# Patient Record
Sex: FEMALE | Race: WHITE | NOT HISPANIC OR LATINO | Employment: PART TIME | URBAN - METROPOLITAN AREA
[De-identification: names, ages, dates, MRNs, and addresses within clinical notes are randomized per-mention and may not be internally consistent; named-entity substitution may affect disease eponyms.]

---

## 2019-10-18 ENCOUNTER — TELEPHONE (OUTPATIENT)
Dept: NEUROLOGY | Facility: CLINIC | Age: 29
End: 2019-10-18

## 2019-10-18 NOTE — TELEPHONE ENCOUNTER
----- Message from Tran Jeff sent at 10/18/2019  8:31 AM CDT -----  Type: Needs Medical Advice    Who Called:  patient  Symptoms (please be specific):  migraines  How long has patient had these symptoms:  sarah  Pharmacy name and phone #:  sarah  Best Call Back Number: [phone]  Additional Information: patient is asking if the referral from Dr Petrona Rolon has been received for Dr Aguilar to see patient for migraines?/please advise/was sent earlier this week

## 2019-10-18 NOTE — TELEPHONE ENCOUNTER
Called patient and informed I have not received the referral yet but I could go ahead and schedule appointment. Appointment scheduled and appointment added to wait list. Patient expressed understanding.

## 2019-10-21 ENCOUNTER — TELEPHONE (OUTPATIENT)
Dept: NEUROLOGY | Facility: CLINIC | Age: 29
End: 2019-10-21

## 2019-10-21 NOTE — TELEPHONE ENCOUNTER
Tried to call patient. No answer. Left voicemail for sooner appointment. Called and spoke with patient's mother, Steffanie. Patient is currently at ER for migraine and mother is headed there. Appointment rescheduled to 11/11/2019 at 8:45 am. Steffanie verbalized understanding and will notify patient once at hospital.

## 2019-10-29 ENCOUNTER — TELEPHONE (OUTPATIENT)
Dept: NEUROLOGY | Facility: CLINIC | Age: 29
End: 2019-10-29

## 2019-10-29 NOTE — TELEPHONE ENCOUNTER
Called and spoke with patient. Offered patient a sooner appointment for tomorrow at 8:45 am. Patient accepted. Appointment rescheduled. Patient verbalized understanding.

## 2019-10-30 ENCOUNTER — OFFICE VISIT (OUTPATIENT)
Dept: NEUROLOGY | Facility: CLINIC | Age: 29
End: 2019-10-30
Payer: COMMERCIAL

## 2019-10-30 ENCOUNTER — LAB VISIT (OUTPATIENT)
Dept: LAB | Facility: HOSPITAL | Age: 29
End: 2019-10-30
Attending: PSYCHIATRY & NEUROLOGY
Payer: COMMERCIAL

## 2019-10-30 VITALS
RESPIRATION RATE: 17 BRPM | HEIGHT: 65 IN | HEART RATE: 93 BPM | BODY MASS INDEX: 28.37 KG/M2 | WEIGHT: 170.31 LBS | DIASTOLIC BLOOD PRESSURE: 75 MMHG | SYSTOLIC BLOOD PRESSURE: 121 MMHG

## 2019-10-30 DIAGNOSIS — G43.001 MIGRAINE WITHOUT AURA AND WITH STATUS MIGRAINOSUS, NOT INTRACTABLE: Primary | ICD-10-CM

## 2019-10-30 DIAGNOSIS — E28.2 PCOS (POLYCYSTIC OVARIAN SYNDROME): ICD-10-CM

## 2019-10-30 DIAGNOSIS — F45.8 BRUXISM: ICD-10-CM

## 2019-10-30 DIAGNOSIS — G43.001 MIGRAINE WITHOUT AURA AND WITH STATUS MIGRAINOSUS, NOT INTRACTABLE: ICD-10-CM

## 2019-10-30 DIAGNOSIS — Z83.49 FAMILY HISTORY OF ADDISON'S DISEASE: ICD-10-CM

## 2019-10-30 DIAGNOSIS — E03.9 HYPOTHYROIDISM, UNSPECIFIED TYPE: ICD-10-CM

## 2019-10-30 LAB
ALBUMIN SERPL BCP-MCNC: 4.6 G/DL (ref 3.5–5.2)
ALP SERPL-CCNC: 44 U/L (ref 55–135)
ALT SERPL W/O P-5'-P-CCNC: 11 U/L (ref 10–44)
ANION GAP SERPL CALC-SCNC: 9 MMOL/L (ref 8–16)
AST SERPL-CCNC: 15 U/L (ref 10–40)
BASOPHILS # BLD AUTO: 0.03 K/UL (ref 0–0.2)
BASOPHILS NFR BLD: 0.4 % (ref 0–1.9)
BILIRUB SERPL-MCNC: 0.5 MG/DL (ref 0.1–1)
BUN SERPL-MCNC: 9 MG/DL (ref 6–20)
CALCIUM SERPL-MCNC: 10.1 MG/DL (ref 8.7–10.5)
CHLORIDE SERPL-SCNC: 102 MMOL/L (ref 95–110)
CO2 SERPL-SCNC: 28 MMOL/L (ref 23–29)
CREAT SERPL-MCNC: 0.8 MG/DL (ref 0.5–1.4)
DIFFERENTIAL METHOD: ABNORMAL
EOSINOPHIL # BLD AUTO: 0.1 K/UL (ref 0–0.5)
EOSINOPHIL NFR BLD: 1.4 % (ref 0–8)
ERYTHROCYTE [DISTWIDTH] IN BLOOD BY AUTOMATED COUNT: 12.8 % (ref 11.5–14.5)
EST. GFR  (AFRICAN AMERICAN): >60 ML/MIN/1.73 M^2
EST. GFR  (NON AFRICAN AMERICAN): >60 ML/MIN/1.73 M^2
GLUCOSE SERPL-MCNC: 91 MG/DL (ref 70–110)
HCT VFR BLD AUTO: 41.6 % (ref 37–48.5)
HGB BLD-MCNC: 14.1 G/DL (ref 12–16)
IMM GRANULOCYTES # BLD AUTO: 0.05 K/UL (ref 0–0.04)
IMM GRANULOCYTES NFR BLD AUTO: 0.6 % (ref 0–0.5)
LYMPHOCYTES # BLD AUTO: 1.9 K/UL (ref 1–4.8)
LYMPHOCYTES NFR BLD: 22.1 % (ref 18–48)
MCH RBC QN AUTO: 30.3 PG (ref 27–31)
MCHC RBC AUTO-ENTMCNC: 33.9 G/DL (ref 32–36)
MCV RBC AUTO: 90 FL (ref 82–98)
MONOCYTES # BLD AUTO: 0.9 K/UL (ref 0.3–1)
MONOCYTES NFR BLD: 10.1 % (ref 4–15)
NEUTROPHILS # BLD AUTO: 5.6 K/UL (ref 1.8–7.7)
NEUTROPHILS NFR BLD: 65.4 % (ref 38–73)
NRBC BLD-RTO: 0 /100 WBC
PLATELET # BLD AUTO: 315 K/UL (ref 150–350)
PMV BLD AUTO: 11.9 FL (ref 9.2–12.9)
POTASSIUM SERPL-SCNC: 4.2 MMOL/L (ref 3.5–5.1)
PROT SERPL-MCNC: 7.9 G/DL (ref 6–8.4)
RBC # BLD AUTO: 4.65 M/UL (ref 4–5.4)
SODIUM SERPL-SCNC: 139 MMOL/L (ref 136–145)
WBC # BLD AUTO: 8.49 K/UL (ref 3.9–12.7)

## 2019-10-30 PROCEDURE — 99204 OFFICE O/P NEW MOD 45 MIN: CPT | Mod: S$GLB,,, | Performed by: PSYCHIATRY & NEUROLOGY

## 2019-10-30 PROCEDURE — 99204 PR OFFICE/OUTPT VISIT, NEW, LEVL IV, 45-59 MIN: ICD-10-PCS | Mod: S$GLB,,, | Performed by: PSYCHIATRY & NEUROLOGY

## 2019-10-30 PROCEDURE — 99999 PR PBB SHADOW E&M-EST. PATIENT-LVL III: CPT | Mod: PBBFAC,,, | Performed by: PSYCHIATRY & NEUROLOGY

## 2019-10-30 PROCEDURE — 85025 COMPLETE CBC W/AUTO DIFF WBC: CPT

## 2019-10-30 PROCEDURE — 3008F BODY MASS INDEX DOCD: CPT | Mod: CPTII,S$GLB,, | Performed by: PSYCHIATRY & NEUROLOGY

## 2019-10-30 PROCEDURE — 99999 PR PBB SHADOW E&M-EST. PATIENT-LVL III: ICD-10-PCS | Mod: PBBFAC,,, | Performed by: PSYCHIATRY & NEUROLOGY

## 2019-10-30 PROCEDURE — 80053 COMPREHEN METABOLIC PANEL: CPT

## 2019-10-30 PROCEDURE — 36415 COLL VENOUS BLD VENIPUNCTURE: CPT | Mod: PO

## 2019-10-30 PROCEDURE — 3008F PR BODY MASS INDEX (BMI) DOCUMENTED: ICD-10-PCS | Mod: CPTII,S$GLB,, | Performed by: PSYCHIATRY & NEUROLOGY

## 2019-10-30 RX ORDER — PANTOPRAZOLE SODIUM 40 MG/1
40 TABLET, DELAYED RELEASE ORAL DAILY
Qty: 17 TABLET | Refills: 0 | Status: SHIPPED | OUTPATIENT
Start: 2019-10-30 | End: 2020-01-06 | Stop reason: ALTCHOICE

## 2019-10-30 RX ORDER — PREDNISONE 10 MG/1
TABLET ORAL
Qty: 55 TABLET | Refills: 0 | Status: SHIPPED | OUTPATIENT
Start: 2019-10-30 | End: 2020-01-06 | Stop reason: ALTCHOICE

## 2019-10-30 RX ORDER — TIZANIDINE 4 MG/1
TABLET ORAL
Qty: 30 TABLET | Refills: 11 | Status: SHIPPED | OUTPATIENT
Start: 2019-10-30 | End: 2020-01-06

## 2019-10-30 NOTE — PROGRESS NOTES
"Date of service: 10/30/2019  Referring provider: Aaareferral Self    Subjective:      Chief complaint: Migraine       Patient ID: Priscila Vazquez is a 29 y.o. lady with migraines, anxiety, polycystic ovarian syndrome, hypothyroidism, chronic fatigue presenting for evaluation of headache    History of Present Illness    ORIGINAL HEADACHE HISTORY - 10/30/2019  Age at onset and course over time:  Patient presenting as an ED follow-up from 10/21/2019 when she went to the ED for persistent headache for 6 weeks.  Was given Toradol, Zofran, Fiorinal, Reglan - caused anxiety.  CT head was negative. Her blood pressure was within normal limits.  Her headaches began at age 14 as menstrual migraines. Over the years gradually became more frequent. In recent times, was doing very well for 4 months. Migraines lasting more days than previous. First week Sept started with mild daily headaches, taking maxalt, then got oral surgery - "cleaned out infection" left over form previous wisdom teeth surgery, surgery was 45 min. Headaches have improved since the ED but still lingering. Family history but no personal history of [place]'s disease. Mom and cousin with severe migraines.   Location:  Right eye, forehead  Quality:  [] pressure [] tight [x] throbbing [] sharp [] stabbing   Severity:  Current 0, bili 0, worse 9  Duration:  Hr  Frequency:  5 days per week, to daily, about 7 pain-free days per month  Headaches awaken at night?:  No  Worst time of day:  Mid day and evening  Associated with: [x] photophobia [x]  phonophobia [] osmophobia [x] blurred vision  [] double vision [x] loss of appetite [x] nausea [] vomiting [] dizziness [] vertigo  [] tinnitus [x] irritability [] sinus pressure [x] problems with concentration   [] neck tightness   Alleviated by:  [x] sleep [x] darkness [] massage [] heat [x] ice [x] medication  Exacerbated by:  [] fatigue [] light [] noise [] smells [x] coughing [] sneezing  [x] bending over [x] ovulation [x] " menses [] alcohol [] change in weather [x]  stress  Ipsilateral autonomic: [] nasal congestion [] lacrimation [] ptosis [] injection [] edema [] foreign body sensation [] ear fullness   ICP:  No transient visual obscurations, presence of low pitched tinnitus - started a few years ago after some detox supplements, she does not think it is related to her migraines, 24/7 bilateral, not pulsatile, non positional  Sleep habits:  She describes for sleep as good  Caffeine intake:  None  Gyn status (if female):  Patient has polycystic ovarian syndrome; she has regular periods; recently started progesterone because it was low, checked by her non [facility] NP; not on any estrogen supplements    MIDAS 70 indicating severe headache related disability    Has changed diet since last week - gluten, sugar-free, veg, fruits     Current acute treatment:  Fiorinal 2 days per week  Maxalt 2 days per week    Current prevention:  None    Previously tried/failed acute treatment:  Excedrin migraine    Previously tried/failed preventative treatment:  topiramate - caused eye pain, blurred vision     Review of patient's allergies indicates:  No Known Allergies  Current Outpatient Medications   Medication Sig Dispense Refill    ARMOUR THYROID 60 mg Tab TAKE 1 TABLET BY MOUTH EVERY DAY 30 tablet 2    butalbital-aspirin-caffeine -40 mg (FIORINAL) -40 mg Cap Take 1 capsule by mouth every 4 (four) hours as needed. 20 capsule 0    LORazepam (ATIVAN) 0.5 MG tablet Take 1 tablet (0.5 mg total) by mouth every 8 (eight) hours as needed. 40 tablet 0    LORazepam (ATIVAN) 0.5 MG tablet Take 1 tablet (0.5 mg total) by mouth every 8 (eight) hours as needed. 40 tablet 1    ondansetron (ZOFRAN-ODT) 4 MG TbDL Take ONE tablet (FOUR MG total) by MOUTH UNDER THE TONGUE every EIGHT (eight) hours as needed for nausea. 25 tablet 2    rizatriptan (MAXALT-MLT) 10 MG disintegrating tablet DISSOLVE 1 TABLET IN MOUTH AT ONSET OF HEADACHE. MAY TAKE 2ND  TABLET 2 HOURS LATER. NO MORE THAN 2 IN 24 HOURS 9 tablet 3    pantoprazole (PROTONIX) 40 MG tablet Take 1 tablet (40 mg total) by mouth once daily. While on steroids for 17 days 17 tablet 0    predniSONE (DELTASONE) 10 MG tablet 5 tab PO x 5 days, then reduce by 10mg every 3 days until finished (17-18-29-20-10).Take with pantoprazole. 55 tablet 0    tiZANidine (ZANAFLEX) 4 MG tablet Half or full tablet by mouth at night as needed for muscle spasm 30 tablet 11     No current facility-administered medications for this visit.        Past Medical History  Past Medical History:   Diagnosis Date    Atypical Chest Pain 04/2016     Dr. Jeffy Cartagena Ruled Out GB DX With 2016 U/S    Chronic Fatigue     4/22/16 B12 = Normal; 4/19/16 Extensive Labs Reviewed    Chronic Mild Shortness Of Breath     4/19/16 Ordered Echocardiogram    Family H/O [place]'s Disease     Her Mother Has This; 04/2016 Random Cortisol And Cortrosyn Stimulation Test Were Normal    Generalized Arthralgias X 2 Weeks 4/19/16 FBM?    4/22/16 RF, MICHAEL, CPK, ESR = Normal     Generalized Paresthesias     Dr. Guille Cabrera Ruled MS Out 03/2016    Hypothyroidism     On [place]othier Thyroid; Her Mother Has Hypothyroidism Too    Migraines     Dr. Guille Cabrera; Her Mother Also Has Migraines    Mitral Valve Prolapse Ruled Out     4/22/16 Echocardiogram = Normal But With Thickened MV With Trivial MR    MTHFR Single Mutation     Her Natriopath Tested This 3/7/16    Polycystic Ovarian Syndrome     Dr. Renea Medrano    Situational Anxiety        Past Surgical History  Past Surgical History:   Procedure Laterality Date    MITRAL VALVE SURGERY      None         Family History  Family History   Problem Relation Age of Onset    Migraines Mother     Thyroid disease Mother     Breast cancer Maternal Aunt 60       Social History  Social History     Socioeconomic History    Marital status:      Spouse name: Not on file    Number of children: Not on file     Years of education: Not on file    Highest education level: Not on file   Occupational History    Not on file   Social Needs    Financial resource strain: Not on file    Food insecurity:     Worry: Not on file     Inability: Not on file    Transportation needs:     Medical: Not on file     Non-medical: Not on file   Tobacco Use    Smoking status: Never Smoker    Smokeless tobacco: Never Used   Substance and Sexual Activity    Alcohol use: No    Drug use: No    Sexual activity: Never   Lifestyle    Physical activity:     Days per week: Not on file     Minutes per session: Not on file    Stress: Not on file   Relationships    Social connections:     Talks on phone: Not on file     Gets together: Not on file     Attends Mosque service: Not on file     Active member of club or organization: Not on file     Attends meetings of clubs or organizations: Not on file     Relationship status: Not on file   Other Topics Concern    Not on file   Social History Narrative    Not on file        Review of Systems  14-point review of systems as follows:   No check ynes indicates NEGATIVE response   Constitutional: [x] weight loss, [x] change to appetite   Eyes: [] change in vision, [] double vision   Ears, nose, mouth, throat: [] frequent nose bleeds, [] ringing in the ears   Respiratory: [] cough, [] wheezing   Cardiovascular: [] chest pain, [] palpitations   Gastrointestinal: [] jaundice, [] nausea/vomiting   Genitourinary: [] incontinence, [] burning with urination   Hematologic/lymphatic: [] easy bruising/bleeding, [] night sweats   Neurological: [] numbness, [] weakness   Endocrine: [] fatigue, [] heat/cold intolerance   Allergy/Immunologic: [] fevers, [] chills   Musculoskeletal: [] muscle pain, [] joint pain   Psychiatric: [x] thoughts of harming self/others, [x] depression   Integumentary: [] rashes, [] sores that do not heal     Objective:        Vitals:    10/30/19 0855   BP: 121/75   Pulse: 93   Resp:  17     Body mass index is 28.34 kg/m².    10/30/19   Constitutional: appears in no acute distress, well-developed, well-nourished     Eyes: normal conjunctiva, PERRLA, optic discs are flat and sharp bilaterally     Ears, nose, mouth, throat: external appearance of ears and nose normal, hearing intact   Tongue scalloped    Cardiovascular: regular rate and rhythm, no murmurs appreciated    Respiratory: unlabored respirations, breath sounds normal bilaterally    Gastrointestinal: no visible abdominal masses, no guarding, no visible hernia    Musculoskeletal: normal tone in all four extremities. No atrophy. No abnormal movements. No pronator drift. No orbit. Symmetric finger tapping. Normal gait and station. Digits and nails normal.      Spine:   CERVICAL SPINE:  ROM: normal   MUSCLE SPASM: no   FACET LOADING: no   SPURLING: no  AMANDA / DOMONIQUE tender: no     Psychiatric: normal judgment and insight. Oriented to person, place, and time.     Neurologic:   Cortical functions: recent and remote memory intact, normal attention span and concentration, speech fluent, adequate fund of knowledge   Cranial nerves: visual fields full, PERRLA, EOMI, symmetric facial strength, hearing intact, palate elevates symmetrically, shoulder shrug 5/5, tongue protrudes midline   Reflexes: 2+ in the upper and lower extremities, no Rahman  Sensation: intact to temperature throughout   Coordination: normal finger to nose, tandem gait    Data Review:     I have personally reviewed the referring provider's notes, labs, & imaging made available to me today.      RADIOLOGY STUDIES:  I have personally reviewed the pertinent images performed.       Results for orders placed or performed during the hospital encounter of 10/21/19   CT Head Without Contrast    Narrative    EXAMINATION:  CT HEAD WITHOUT CONTRAST    CLINICAL HISTORY:  Headache, acute, norm neuro exam;    TECHNIQUE:  Axial images of the head were obtained without IV contrast administration.   Coronal and sagittal reconstructions were provided.  Three dimensional and MIP images were obtained and evaluated.  Total DLP was 820.64 mGy-cm. Dose lowering technique and automated exposure control were utilized for this exam.    COMPARISON:  CT of the head 01/02/2013.    FINDINGS:  There is normal brain formation.  There is normal gray-white matter differentiation.  There is no hemorrhage, hydrocephalus, or midline shift.  There is no cytotoxic or vasogenic edema.  There is no intra or extra-axial fluid collection.  There is no herniation.    The calvarium is intact.  There is no fracture.  The bilateral orbits are normal.  The paranasal sinuses and mastoid air cells are normally developed and well aerated.      Impression    No acute intracranial abnormality.      Electronically signed by: Albino Win MD  Date:    10/21/2019  Time:    14:41       Lab Results   Component Value Date     06/28/2017    K 3.9 06/28/2017     06/28/2017    CO2 28 06/28/2017    BUN 10 06/28/2017    CREATININE 0.61 06/28/2017    GLU 88 06/28/2017    AST 17 06/28/2017    AST 15 04/12/2016    ALT 29 06/28/2017    ALBUMIN 5.0 06/28/2017    PROT 8.3 06/28/2017    BILITOT 0.6 06/28/2017    CHOL 147 04/22/2016    HDL 44 04/22/2016    LDLCALC 91.0 04/22/2016    TRIG 60 04/22/2016       Lab Results   Component Value Date    WBC 7.97 06/28/2017    HGB 14.3 06/28/2017    HCT 41.0 06/28/2017    MCV 87 06/28/2017     06/28/2017       Lab Results   Component Value Date    TSH 0.350 (L) 06/28/2017           Assessment & Plan:       Problem List Items Addressed This Visit        Neuro    Migraine without aura and with status migrainosus, not intractable - Primary    Overview     Lifelong history of menstrual migraines starting with menarche, progressing over the years to episodic migraine, now at borderline high-frequency episodic/chronic frequency with frequent very long episodes of status migrainosus.  Strong family history of  migraine.  Neurological exam is very reassuring as CT head.  Optic discs are normal.  Recent normal eye exam.    There are no red flag features to this episodic pattern, the patient is most likely experiencing central sensitization as a cause of prolonged migraine.  Needs a very strong bridge regimen to break the cycle, we discussed the use of high-dose prednisone, nerve blocks, Methergine, infusions.  At of these, the most effective, and most cost effective will be prednisone and I provided ample education on this.          Relevant Medications    predniSONE (DELTASONE) 10 MG tablet    pantoprazole (PROTONIX) 40 MG tablet    tiZANidine (ZANAFLEX) 4 MG tablet    Other Relevant Orders    Comprehensive metabolic panel    CBC auto differential       ENT    Bruxism    Overview     Recommended tizanidine         Relevant Medications    tiZANidine (ZANAFLEX) 4 MG tablet       Endocrine    Hypothyroidism    Overview     On armour thyroid  Reports recently checked and OK         Polycystic Ovarian Syndrome    Overview     Repots periods are regular  I see no indication for patient to be on progesterone, recommended follow up by a gynecologist            Other    Family H/O [place]'s Disease    Current Assessment & Plan     Check electrolytes         Relevant Orders    Comprehensive metabolic panel    CBC auto differential              Please call our clinic at [phone] or send a message to Dr. Aguilar on the [facility] portal if there are any changes to the plan described below, for example,if you are not contacted for the requested tests, referral(s) within one week, if you are unable to receive the medications prescribed, or if you feel you need to change the treatment course for any reason.     Sign up for portal     TESTING:  -- schedule basic lab work (can do today)     REFERRALS:  -- none    PREVENTION (use daily regardless of headache):  -- start magnesium in ONE of the following preparations -    1. Magnesium oxide  800mg daily (the most common over the counter kind, may causes loose stools)   2. Magnesium citrate 400-500mg daily (harder to find, but more neutral on the bowels)   3. Magnesium glycinate 400mg daily (hardest to find, look online, but most bowel-neutral, best absorbed)   -- start riboflavin 400mg daily  -- start coenzyme q10 300mg daily (purchase when on sale)  -- alternative, may purchase ONE combination supplement as in the handout   -- start tizanidine half or full tablet at night for jaw tightness (take nightly at least 3 weeks, then may use as needed)  -- next line would be the pulse medication Propranolol     BRIDGE (to help NOW)  -- start 17 day course of steroids, in the morning, with food, with pantoprazole (acid medicine)  -- may continue your ativan if this causes anxiety     AS-NEEDED TREATMENT (use total no more than 10 days per month unless otherwise stated):  -- continue Maxalt (rizatriptan) at onset of migraine. May repeat every 2 hours. No more than 3 doses per day, 10 days per month  -- limit fioricet to no more than 5-10 tablets per month (for emergencies)    Follow up in about 3 months (around 1/30/2020) for office visit.    Rere Aguilar MD

## 2019-10-30 NOTE — PATIENT INSTRUCTIONS
Please call our clinic at [phone] or send a message to Dr. Aguilar on the [facility] portal if there are any changes to the plan described below, for example,if you are not contacted for the requested tests, referral(s) within one week, if you are unable to receive the medications prescribed, or if you feel you need to change the treatment course for any reason.     TESTING:  -- schedule basic lab work (can do today)     REFERRALS:  -- none    PREVENTION (use daily regardless of headache):  -- start magnesium in ONE of the following preparations -    1. Magnesium oxide 800mg daily (the most common over the counter kind, may causes loose stools)   2. Magnesium citrate 400-500mg daily (harder to find, but more neutral on the bowels)   3. Magnesium glycinate 400mg daily (hardest to find, look online, but most bowel-neutral, best absorbed)   -- start riboflavin 400mg daily  -- start coenzyme q10 300mg daily (purchase when on sale)  -- alternative, may purchase ONE combination supplement as in the handout   -- start tizanidine half or full tablet at night for jaw tightness (take nightly at least 3 weeks, then may use as needed)  -- next line would be the pulse medication Propranolol     BRIDGE (to help NOW)  -- start 17 day course of steroids, in the morning, with food, with pantoprazole (acid medicine)  -- may continue your ativan if this causes anxiety     AS-NEEDED TREATMENT (use total no more than 10 days per month unless otherwise stated):  -- continue Maxalt (rizatriptan) at onset of migraine. May repeat every 2 hours. No more than 3 doses per day, 10 days per month  -- limit fioricet to no more than 5-10 tablets per month (for emergencies)      -----    Vitamins and Dietary Supplements for Headache Prevention     Certain vitamins and food supplements may help prevent headaches when taken regularly. The best evidence exists for the agents below (from small but reasonably well-designed published trials). Side  effects are typically mild. Optimal doses are unknown, but are likely higher than found in typical multi-vitamins. Be wary of any unsubstantiated claims of spectacular benefits.       B2/Riboflavin - up to 400mg / day   Magnesium oxide- up to 400mg 2x / day (diarrhea possible) or Magnesium citrate 600mg once per day   Coenzyme Q10 - up to 100mg 3x / day (expensive)   Butterburr (Petasites hybridus) extract, Petadolex brand (pyrrolizidine alkaloid free), 50-75mg twice a day with food (expensive), http://www.petadolex.com.   Feverfew (Parthenium integrifolium) 50mg+ / day (inexpensive, weaker / contradictory evidence for effectiveness)   Melatonin - There is some evidence that this sleep hormone may help headaches. 3mg has been shown to be helpful in migraine, up to 9mg in cluster headaches. It should be taken ~1 hour before desired bedtime. Significant side effects are rare, but it may cause odd dreams or disruption of normal sleep-wake cycles. Higher doses (up to 24mg or so) may be useful as a substitute for indomethacin (the two molecules are chemically similar), a strong pain reliever effective for certain rare types of headache.       Migraine Multivitamins. There are a companies that package more than one of the above vitamins / supplements into a single pill. Some are available directly from , but most are available from a vitamin catalogues or websites:   Dolovent, info. Available at http://www.[employer].com (multivitamin with extra B2, Magnesium, CoEnzyme Q10)   HeadacheFree, http://www.headachefreevitamins.com (multivitamin, but with higher amounts of B2 and magnesium)   MigraDefense (contains magnesium, butterburr, and feverfew, but also contains supplements that have not been tested in trials for migraine)   MigreLief, http://www.migrelief.com (contains B2, magnesium, feverfew)   MigraMAXX (contains B2, magnesium, and butterbur)   Migravent, info. available at http://www.migravent.ViaSat (contains  B2, magnesium, CoEnzyme Q10, butterburr)   Migrasolve (contains B2 and butterbur).       We do not specifically endorse any brand name item, nor do we have any financial interest in any of these products.     Be aware that in the [place] vitamins and food supplements are not FDA-regulated. Thus there is absolutely no guarantee that what you are buying contains the listed ingredients or doses. One study from Consumer Reports indicates that the least expensive brands of supplements are often the ones that skimp on the active ingredients.

## 2019-10-31 ENCOUNTER — PATIENT MESSAGE (OUTPATIENT)
Dept: NEUROLOGY | Facility: CLINIC | Age: 29
End: 2019-10-31

## 2019-11-04 ENCOUNTER — TELEPHONE (OUTPATIENT)
Dept: NEUROLOGY | Facility: CLINIC | Age: 29
End: 2019-11-04

## 2019-11-04 NOTE — TELEPHONE ENCOUNTER
----- Message from Marissa Garces sent at 11/4/2019  3:25 PM CST -----  Contact: Pt  Patient is requesting a call back    Please call    Phone [phone]

## 2019-11-04 NOTE — TELEPHONE ENCOUNTER
Returned call and spoke with patient. Patient was concerned about her follow up being so far out and about what to do if the medications do not work. Informed patient that 3 month follow ups are standard and that if her medication is not helping her before then, she could come in for a follow up sooner if needed. Patient is having some relief from the steroids, but not complete relief. Advised patient to start the supplements and finish taking her steroid course and call us if she does not have relief. Patient verbalized understanding.

## 2019-11-11 ENCOUNTER — TELEPHONE (OUTPATIENT)
Dept: NEUROLOGY | Facility: CLINIC | Age: 29
End: 2019-11-11

## 2019-11-11 NOTE — TELEPHONE ENCOUNTER
Returned call and spoke with patient. Patient reports steroids are not helping her. Patient would like to know what else she could do. Informed patient per Dr. Aguilar that she either do injections or an infusion. Patient chose injections. Informed patient that I would call her back once the injections are approved with her insurance company. Patient verbalized understanding.

## 2019-11-11 NOTE — TELEPHONE ENCOUNTER
----- Message from Tamika Mathews sent at 11/11/2019  8:37 AM CST -----  Pt would like a call back with the nurse about an appt     [phone]

## 2019-11-15 ENCOUNTER — TELEPHONE (OUTPATIENT)
Dept: NEUROLOGY | Facility: CLINIC | Age: 29
End: 2019-11-15

## 2019-11-20 ENCOUNTER — TELEPHONE (OUTPATIENT)
Dept: NEUROLOGY | Facility: CLINIC | Age: 29
End: 2019-11-20

## 2019-11-20 NOTE — TELEPHONE ENCOUNTER
Returned call and spoke with patient. Appointment scheduled for tomorrow. Patient verbalized understanding.

## 2019-11-20 NOTE — TELEPHONE ENCOUNTER
----- Message from Ratna Parisi sent at 11/20/2019 10:00 AM CST -----  Contact: [phone]/self  Patient would like to know if her injection has been approved  Please call back to assist at [phone]

## 2019-11-21 ENCOUNTER — PROCEDURE VISIT (OUTPATIENT)
Dept: NEUROLOGY | Facility: CLINIC | Age: 29
End: 2019-11-21
Payer: COMMERCIAL

## 2019-11-21 ENCOUNTER — TELEPHONE (OUTPATIENT)
Dept: NEUROLOGY | Facility: CLINIC | Age: 29
End: 2019-11-21

## 2019-11-21 VITALS
HEIGHT: 65 IN | HEART RATE: 93 BPM | SYSTOLIC BLOOD PRESSURE: 136 MMHG | DIASTOLIC BLOOD PRESSURE: 80 MMHG | BODY MASS INDEX: 27.7 KG/M2 | WEIGHT: 166.25 LBS | RESPIRATION RATE: 16 BRPM

## 2019-11-21 DIAGNOSIS — M26.69 TMJ CAPSULITIS: Primary | ICD-10-CM

## 2019-11-21 DIAGNOSIS — F45.8 BRUXISM: ICD-10-CM

## 2019-11-21 PROCEDURE — 20605 PR DRAIN/INJECT INTERMEDIATE JOINT/BURSA: ICD-10-PCS | Mod: RT,S$GLB,, | Performed by: PSYCHIATRY & NEUROLOGY

## 2019-11-21 PROCEDURE — 20605 DRAIN/INJ JOINT/BURSA W/O US: CPT | Mod: RT,S$GLB,, | Performed by: PSYCHIATRY & NEUROLOGY

## 2019-11-21 RX ORDER — TRIAMCINOLONE ACETONIDE 40 MG/ML
12 INJECTION, SUSPENSION INTRA-ARTICULAR; INTRAMUSCULAR ONCE
Status: COMPLETED | OUTPATIENT
Start: 2019-11-21 | End: 2019-11-21

## 2019-11-21 RX ORDER — LIDOCAINE HYDROCHLORIDE 10 MG/ML
1 INJECTION, SOLUTION EPIDURAL; INFILTRATION; INTRACAUDAL; PERINEURAL ONCE
Status: COMPLETED | OUTPATIENT
Start: 2019-11-21 | End: 2019-11-21

## 2019-11-21 RX ADMIN — TRIAMCINOLONE ACETONIDE 12 MG: 40 INJECTION, SUSPENSION INTRA-ARTICULAR; INTRAMUSCULAR at 05:11

## 2019-11-21 RX ADMIN — LIDOCAINE HYDROCHLORIDE 10 MG: 10 INJECTION, SOLUTION EPIDURAL; INFILTRATION; INTRACAUDAL; PERINEURAL at 05:11

## 2019-11-21 NOTE — PROCEDURES
Procedures     Patient was scheduled for an occipital nerve block and trigger point injections to help with her headaches and neck pain. Upon presenting to the clinic she did not want to have this procedure done, she was under the impression that we are doing a procedure on her jaw.  She reported that she really thought the problem was her jaw given that it started after a dental procedure.  Several options were discussed and we decided together to proceed with a right TMJ injection both for diagnostic and therapeutic reasons.    Afterwards the patient developed a mild weakness of the right forehead due to lidocaine effect on the branches of the facial nerve.  Patient was warned prior to that this may happen temporarily due to lidocaine.  We discussed doing the procedure on both sides given that the jaw was bothering her on right and left, however after this event occurred she became very scared, did not like how her face was feeling, did not want to proceed with left side.  She did report that the pain in her face, occiput and her neck had subsided afterwards.    She was called for follow-up approximately 1.5 hours later, went to [facility].    -----    TEMPOROMANDIBULAR JOINT INJECTION - right     Indication: temporomandibular joint pain     Anesthesia: none     After risks and benefits were explained including bleeding, infection, worsening of the pain, damage to the area being injected, weakness, allergic reaction to medications, vascular injection, and nerve damage, signed consent was obtained. All questions were answered.     With the patient seated, I palpated right TMJ with the mouth closed and then the fully open position. The sulcus of the joint was identified and marked and patient was asked not to move jaw. Next, this area was prepped with alcohol. A 3ml syringe was used attached to 27g 1 inch needle. The joint was approached from posterior to the patient, with the needle making a 30 degree angle to the  sagittal plane, directed anteromedially. Needle was advanced intot joint capsule until loss of resistance was felt. Once in the joint, after negative aspiration of heme, I injected 1cc of the below listed solution. The patient was then asked to open and close jaw through full range of motion several times.     Medications used: lidocaine 1% and Triamcinolone 10mg     Total volume infused: 1 cc     Estimated blood loss: none     RTC as needed

## 2019-12-04 ENCOUNTER — TELEPHONE (OUTPATIENT)
Dept: NEUROLOGY | Facility: CLINIC | Age: 29
End: 2019-12-04

## 2019-12-04 DIAGNOSIS — G43.001 MIGRAINE WITHOUT AURA AND WITH STATUS MIGRAINOSUS, NOT INTRACTABLE: Primary | ICD-10-CM

## 2019-12-04 RX ORDER — GALCANEZUMAB 120 MG/ML
INJECTION, SOLUTION SUBCUTANEOUS
Qty: 2 ML | Refills: 0 | Status: SHIPPED | OUTPATIENT
Start: 2019-12-04 | End: 2020-01-23 | Stop reason: DRUGHIGH

## 2019-12-04 NOTE — TELEPHONE ENCOUNTER
Returned call and spoke with patient. Patient has had a daily migraine. Patient would like to know what else she can do. Informed patient the next step would be infusions per our last discussion. Patient discussed a possible preventative like Aimovig or Emgality. Patient is also willing to come back in for an occipital nerve block. Patient was just really nervous at her last visit and she now knows what to expect. She just wants relief and is unsure if she should come in for a follow up to further discuss options. Please advise.

## 2019-12-04 NOTE — TELEPHONE ENCOUNTER
----- Message from Michelle Carolina sent at 12/4/2019  9:47 AM CST -----  Pt need to speak with the nurse. Pt stated she is still suffering from migraines daily.       Pt contact # [phone].      Thanks

## 2019-12-05 ENCOUNTER — TELEPHONE (OUTPATIENT)
Dept: PHARMACY | Facility: CLINIC | Age: 29
End: 2019-12-05

## 2019-12-05 ENCOUNTER — TELEPHONE (OUTPATIENT)
Dept: NEUROLOGY | Facility: CLINIC | Age: 29
End: 2019-12-05

## 2019-12-05 NOTE — TELEPHONE ENCOUNTER
The TMJ injection did not help. It just made her jaw area numb. She ended up have a anxiety attack afterwards. Patient did see TMJ specialist on 12/03/2019. He gave her an injection in the occipital area (according to the patient). The injection has Lidocaine and Saparin. Patient does have slight relief.

## 2019-12-05 NOTE — TELEPHONE ENCOUNTER
----- Message from Charlette Daniel sent at 12/5/2019  1:02 PM CST -----  Contact: Pt  Pt says a prescription was sent to pharmacy and never received any information about this new medication requesting a callback at [phone]

## 2019-12-05 NOTE — TELEPHONE ENCOUNTER
Ok to begin Emgality, sent to [facility]  Please ask her for an update how the TMJ injection worked  Did she see the TMJ specialist like she was planning?

## 2019-12-13 ENCOUNTER — TELEPHONE (OUTPATIENT)
Dept: NEUROLOGY | Facility: CLINIC | Age: 29
End: 2019-12-13

## 2019-12-17 NOTE — TELEPHONE ENCOUNTER
DOCUMENTATION ONLY:   Prior authorization for emgality approved from 12/17/2019 to 03/13/2020.     Case ID# [id number]    Co-pay: $35-$0 with E-Voucher    Patient Assistance IS NOT required.     Forward to clinical pharmacist for consult & shipment. FLC

## 2019-12-26 ENCOUNTER — TELEPHONE (OUTPATIENT)
Dept: NEUROLOGY | Facility: CLINIC | Age: 29
End: 2019-12-26

## 2019-12-26 NOTE — TELEPHONE ENCOUNTER
----- Message from Bailee Owen sent at 12/26/2019  9:04 AM CST -----  Contact: patient  Type: Needs Medical Advice    Who Called:  Patient  Symptoms (please be specific):  Muscle Jerks  How long has patient had these symptoms:  Since Monday 12/23  Best Call Back Number:   Additional Information: Would like to schedule, sees Dr Aguilar already for headaches-Please advise-thank you

## 2019-12-27 ENCOUNTER — TELEPHONE (OUTPATIENT)
Dept: NEUROLOGY | Facility: CLINIC | Age: 29
End: 2019-12-27

## 2019-12-27 NOTE — TELEPHONE ENCOUNTER
----- Message from Vee Curtis sent at 12/27/2019  8:58 AM CST -----  Contact: patient  Type: Needs Medical Advice    Who Called:  patient  Best Call Back Number: na  Additional Information: Patient states office does not have to call her back today, no other information given.Thanks!

## 2019-12-27 NOTE — TELEPHONE ENCOUNTER
Returned call and spoke with patient. Patient scheduled an appointment with a general neurologist for the other issues she is having. Patient did not mention what the issues were. Patient is going to see her old neurologist. Instructed patient to call us back if she needs anything else. Patient verbalized understanding.

## 2020-01-02 NOTE — TELEPHONE ENCOUNTER
----- Message from Elsy Mariee sent at 12/13/2019  2:57 PM CST -----  Aurelia    //   The  Call  Is  from  [facility]   Calling to to  Eb[facility] // call was  Disconnected // call  Back [phone]  
----- Message from Nara Lester sent at 12/13/2019 12:18 PM CST -----  Pt is requesting a call back.pt states she has been waiting on a call back since last week.  Please call and advise            Thank you  
----- Message from Trinity Mackenzie sent at 12/13/2019  4:08 PM CST -----  Contact: kendall with [facility] prior auth dept ph# [phone]  kendall with [facility] prior [facility] dept ph# [phone]  Requesting additional information   
- - -

## 2020-01-03 ENCOUNTER — PATIENT MESSAGE (OUTPATIENT)
Dept: NEUROLOGY | Facility: CLINIC | Age: 30
End: 2020-01-03

## 2020-01-05 ENCOUNTER — PATIENT MESSAGE (OUTPATIENT)
Dept: NEUROLOGY | Facility: CLINIC | Age: 30
End: 2020-01-05

## 2020-01-06 ENCOUNTER — PATIENT MESSAGE (OUTPATIENT)
Dept: NEUROLOGY | Facility: CLINIC | Age: 30
End: 2020-01-06

## 2020-01-06 PROBLEM — E88.811 TYPE A INSULIN RESISTANCE WITHOUT COMPLICATION: Status: ACTIVE | Noted: 2017-05-17

## 2020-01-07 ENCOUNTER — OFFICE VISIT (OUTPATIENT)
Dept: NEUROLOGY | Facility: CLINIC | Age: 30
End: 2020-01-07
Payer: COMMERCIAL

## 2020-01-07 ENCOUNTER — PATIENT MESSAGE (OUTPATIENT)
Dept: NEUROLOGY | Facility: CLINIC | Age: 30
End: 2020-01-07

## 2020-01-07 VITALS
SYSTOLIC BLOOD PRESSURE: 115 MMHG | BODY MASS INDEX: 27.16 KG/M2 | DIASTOLIC BLOOD PRESSURE: 78 MMHG | RESPIRATION RATE: 16 BRPM | WEIGHT: 163 LBS | HEIGHT: 65 IN | HEART RATE: 64 BPM

## 2020-01-07 DIAGNOSIS — G43.001 MIGRAINE WITHOUT AURA AND WITH STATUS MIGRAINOSUS, NOT INTRACTABLE: Primary | ICD-10-CM

## 2020-01-07 PROCEDURE — 99999 PR PBB SHADOW E&M-EST. PATIENT-LVL III: ICD-10-PCS | Mod: PBBFAC,,, | Performed by: NURSE PRACTITIONER

## 2020-01-07 PROCEDURE — 3008F PR BODY MASS INDEX (BMI) DOCUMENTED: ICD-10-PCS | Mod: CPTII,S$GLB,, | Performed by: NURSE PRACTITIONER

## 2020-01-07 PROCEDURE — 99213 PR OFFICE/OUTPT VISIT, EST, LEVL III, 20-29 MIN: ICD-10-PCS | Mod: S$GLB,,, | Performed by: NURSE PRACTITIONER

## 2020-01-07 PROCEDURE — 99213 OFFICE O/P EST LOW 20 MIN: CPT | Mod: S$GLB,,, | Performed by: NURSE PRACTITIONER

## 2020-01-07 PROCEDURE — 99999 PR PBB SHADOW E&M-EST. PATIENT-LVL III: CPT | Mod: PBBFAC,,, | Performed by: NURSE PRACTITIONER

## 2020-01-07 PROCEDURE — 3008F BODY MASS INDEX DOCD: CPT | Mod: CPTII,S$GLB,, | Performed by: NURSE PRACTITIONER

## 2020-01-07 RX ORDER — PROCHLORPERAZINE MALEATE 10 MG
10 TABLET ORAL EVERY 6 HOURS PRN
Qty: 60 TABLET | Refills: 11 | Status: SHIPPED | OUTPATIENT
Start: 2020-01-07 | End: 2021-04-16

## 2020-01-07 NOTE — ASSESSMENT & PLAN NOTE
After discussing indications for toradol and that it will not prevent headache, patient requested toradol as she was already in clinic. Attempted to give IV toradol and able to insert butterfly needle with + flashback however, unable to administer dose of toradol as needle dislodged. Patient educated on prevention and encouraged to continue cymbalta and initiate Emgality. She states she will not start Emgality until after she sees a TMJ specialist next week in [place]. She also reports possible new burning mouth symptoms. She has follow up with Dr. Aguilar 1/28.   Add compazine to help with daily headaches. Not to take same day as phenergan and/or zofran.   Continue supplements for migraine prevention.

## 2020-01-07 NOTE — PROGRESS NOTES
"Date of service: 1/7/2020  Referring provider: No ref. provider found    Subjective:      Chief complaint: Headache       Patient ID: Priscila Vazquez is a 29 y.o. lady with migraines, anxiety, polycystic ovarian syndrome, hypothyroidism, chronic fatigue presenting for follow up of headache    History of Present Illness    INTERVAL HISTORY 1/7/2020  Patient presents for follow up of headache. At initial visit, she was started on a 17-day prednisone bridge (provided some relief), tizanidine for TMJ,and vitamins and minerals for migraine prevention. She was given maxalt and limited fioricet for acute management. Since that time, she has been started on cymbalta (started today) and emgality (has not started).   She initially was seen today for severe headache requesting toradol IV. She was having daily headaches for the past two weeks. At its worst, these headaches were a 6/10 without migranous features. She was taking the maxalt and fioricet. These provided relief. She reports current headache 3/10 but concerns for escalation tonight.      ORIGINAL HEADACHE HISTORY - 10/30/2019  Age at onset and course over time:  Patient presenting as an ED follow-up from 10/21/2019 when she went to the ED for persistent headache for 6 weeks.  Was given Toradol, Zofran, Fiorinal, Reglan - caused anxiety.  CT head was negative. Her blood pressure was within normal limits.  Her headaches began at age 14 as menstrual migraines. Over the years gradually became more frequent. In recent times, was doing very well for 4 months. Migraines lasting more days than previous. First week Sept started with mild daily headaches, taking maxalt, then got oral surgery - "cleaned out infection" left over form previous wisdom teeth surgery, surgery was 45 min. Headaches have improved since the ED but still lingering. Family history but no personal history of Austin's disease. Mom and cousin with severe migraines.   Location:  Right eye, forehead  Quality:  " [] pressure [] tight [x] throbbing [] sharp [] stabbing   Severity:  Current 0, bili 0, worse 9  Duration:  Hr  Frequency:  5 days per week, to daily, about 7 pain-free days per month  Headaches awaken at night?:  No  Worst time of day:  Mid day and evening  Associated with: [x] photophobia [x]  phonophobia [] osmophobia [x] blurred vision  [] double vision [x] loss of appetite [x] nausea [] vomiting [] dizziness [] vertigo  [] tinnitus [x] irritability [] sinus pressure [x] problems with concentration   [] neck tightness   Alleviated by:  [x] sleep [x] darkness [] massage [] heat [x] ice [x] medication  Exacerbated by:  [] fatigue [] light [] noise [] smells [x] coughing [] sneezing  [x] bending over [x] ovulation [x] menses [] alcohol [] change in weather [x]  stress  Ipsilateral autonomic: [] nasal congestion [] lacrimation [] ptosis [] injection [] edema [] foreign body sensation [] ear fullness   ICP:  No transient visual obscurations, presence of low pitched tinnitus - started a few years ago after some detox supplements, she does not think it is related to her migraines, 24/7 bilateral, not pulsatile, non positional  Sleep habits:  She describes for sleep as good  Caffeine intake:  None  Gyn status (if female):  Patient has polycystic ovarian syndrome; she has regular periods; recently started progesterone because it was low, checked by her non [facility] NP; not on any estrogen supplements    MIDAS 70 indicating severe headache related disability    Has changed diet since last week - gluten, sugar-free, veg, fruits     Current acute treatment:  Fiorinal 2 days per week  Maxalt 2 days per week    Current prevention:  Emgality - has not started  (Cymbalta)    Previously tried/failed acute treatment:  Excedrin migraine    Previously tried/failed preventative treatment:  topiramate - caused eye pain, blurred vision     Review of patient's allergies indicates:  No Known Allergies  Current Outpatient Medications    Medication Sig Dispense Refill    DULoxetine (CYMBALTA) 20 MG capsule Take 1 capsule (20 mg total) by mouth once daily. 30 capsule 11    fluconazole (DIFLUCAN) 100 MG tablet Take 1 tablet (100 mg total) by mouth once daily. 7 tablet 0    galcanezumab-gnlm (EMGALITY PEN) 120 mg/mL PnIj Inject 120 mg into the skin every 28 days. 1 mL 11    LORazepam (ATIVAN) 1 MG tablet Take 1 tablet (1 mg total) by mouth every 12 (twelve) hours as needed for Anxiety. 60 tablet 2    ondansetron (ZOFRAN-ODT) 8 MG TbDL Take 1 tablet (8 mg total) by mouth every 6 (six) hours as needed. 25 tablet 5    promethazine (PHENERGAN) 25 MG tablet Take 1 tablet (25 mg total) by mouth every 6 (six) hours as needed for Nausea. 25 tablet 5    rizatriptan (MAXALT-MLT) 10 MG disintegrating tablet DISSOLVE 1 TABLET IN MOUTH AT ONSET OF HEADACHE. MAY TAKE 2ND TABLET 2 HOURS LATER. NO MORE THAN 2 IN 24 HOURS 9 tablet 3    thyroid, pork, (ARMOUR THYROID) 60 mg Tab Take 1 tablet (60 mg total) by mouth once daily. 30 tablet 2    prochlorperazine (COMPAZINE) 10 MG tablet Take 1 tablet (10 mg total) by mouth every 6 (six) hours as needed (migraine or nausea). 60 tablet 11     No current facility-administered medications for this visit.        Past Medical History  Past Medical History:   Diagnosis Date    Atypical Chest Pain 04/2016     Dr. Jeffy Cartagena Ruled Out GB DX With 2016 U/S    Chronic Fatigue     4/22/16 B12 = Normal; 4/19/16 Extensive Labs Reviewed    Chronic Mild Shortness Of Breath     4/19/16 Ordered Echocardiogram    Family H/O E[place]'s Disease     Her Mother Has This; 04/2016 Random Cortisol And Cortrosyn Stimulation Test Were Normal    Generalized Arthralgias X 2 Weeks 4/19/16 FBM?    4/22/16 RF, MICHAEL, CPK, ESR = Normal     Generalized Paresthesias     Dr. Guille Cabrera Ruled MS Out 03/2016    Hypothyroidism     On Waves Thyroid; Her Mother Has Hypothyroidism Too    Migraines     Dr. Guille Cabrera; Her Mother Also Has  Migraines    Mitral Valve Prolapse Ruled Out     4/22/16 Echocardiogram = Normal But With Thickened MV With Trivial MR    MTHFR Single Mutation     Her Natriopath Tested This 3/7/16    Polycystic Ovarian Syndrome     Dr. Renea Medrano    Situational Anxiety        Past Surgical History  Past Surgical History:   Procedure Laterality Date    MITRAL VALVE SURGERY      None         Family History  Family History   Problem Relation Age of Onset    Migraines Mother     Thyroid disease Mother     Breast cancer Maternal Aunt 60       Social History  Social History     Socioeconomic History    Marital status:      Spouse name: Not on file    Number of children: Not on file    Years of education: Not on file    Highest education level: Not on file   Occupational History    Not on file   Social Needs    Financial resource strain: Not on file    Food insecurity:     Worry: Not on file     Inability: Not on file    Transportation needs:     Medical: Not on file     Non-medical: Not on file   Tobacco Use    Smoking status: Never Smoker    Smokeless tobacco: Never Used   Substance and Sexual Activity    Alcohol use: No    Drug use: No    Sexual activity: Never   Lifestyle    Physical activity:     Days per week: Not on file     Minutes per session: Not on file    Stress: Not on file   Relationships    Social connections:     Talks on phone: Not on file     Gets together: Not on file     Attends Voodoo service: Not on file     Active member of club or organization: Not on file     Attends meetings of clubs or organizations: Not on file     Relationship status: Not on file   Other Topics Concern    Not on file   Social History Narrative    Not on file        Review of Systems  14-point review of systems as follows:   No check ynes indicates NEGATIVE response   Constitutional: [x] weight loss, [x] change to appetite   Eyes: [] change in vision, [] double vision   Ears, nose, mouth, throat: []  frequent nose bleeds, [] ringing in the ears   Respiratory: [] cough, [] wheezing   Cardiovascular: [] chest pain, [] palpitations   Gastrointestinal: [] jaundice, [] nausea/vomiting   Genitourinary: [] incontinence, [] burning with urination   Hematologic/lymphatic: [] easy bruising/bleeding, [] night sweats   Neurological: [] numbness, [] weakness   Endocrine: [] fatigue, [] heat/cold intolerance   Allergy/Immunologic: [] fevers, [] chills   Musculoskeletal: [] muscle pain, [] joint pain   Psychiatric: [x] thoughts of harming self/others, [x] depression   Integumentary: [] rashes, [] sores that do not heal     Objective:        Vitals:    01/07/20 1323   BP: 115/78   Pulse: 64   Resp: 16     Body mass index is 27.12 kg/m².    10/30/19   Constitutional: appears in no acute distress, well-developed, well-nourished     Eyes: normal conjunctiva, PERRLA    Ears, nose, mouth, throat: external appearance of ears and nose normal, hearing intact   Tongue scalloped      Psychiatric: normal judgment and insight. Oriented to person, place, and time.     Data Review:     I have personally reviewed the referring provider's notes, labs, & imaging made available to me today.      RADIOLOGY STUDIES:  I have personally reviewed the pertinent images performed.       Results for orders placed or performed during the hospital encounter of 10/21/19   CT Head Without Contrast    Narrative    EXAMINATION:  CT HEAD WITHOUT CONTRAST    CLINICAL HISTORY:  Headache, acute, norm neuro exam;    TECHNIQUE:  Axial images of the head were obtained without IV contrast administration.  Coronal and sagittal reconstructions were provided.  Three dimensional and MIP images were obtained and evaluated.  Total DLP was 820.64 mGy-cm. Dose lowering technique and automated exposure control were utilized for this exam.    COMPARISON:  CT of the head 01/02/2013.    FINDINGS:  There is normal brain formation.  There is normal gray-white matter  differentiation.  There is no hemorrhage, hydrocephalus, or midline shift.  There is no cytotoxic or vasogenic edema.  There is no intra or extra-axial fluid collection.  There is no herniation.    The calvarium is intact.  There is no fracture.  The bilateral orbits are normal.  The paranasal sinuses and mastoid air cells are normally developed and well aerated.      Impression    No acute intracranial abnormality.      Electronically signed by: Albino Win MD  Date:    10/21/2019  Time:    14:41       Lab Results   Component Value Date     10/30/2019    K 4.2 10/30/2019     10/30/2019    CO2 28 10/30/2019    BUN 9 10/30/2019    CREATININE 0.8 10/30/2019    GLU 91 10/30/2019    AST 15 10/30/2019    AST 15 04/12/2016    ALT 11 10/30/2019    ALBUMIN 4.6 10/30/2019    PROT 7.9 10/30/2019    BILITOT 0.5 10/30/2019    CHOL 147 04/22/2016    HDL 44 04/22/2016    LDLCALC 91.0 04/22/2016    TRIG 60 04/22/2016       Lab Results   Component Value Date    WBC 8.49 10/30/2019    HGB 14.1 10/30/2019    HCT 41.6 10/30/2019    MCV 90 10/30/2019     10/30/2019       Lab Results   Component Value Date    TSH 0.350 (L) 06/28/2017           Assessment & Plan:       Problem List Items Addressed This Visit        Neuro    Migraine without aura and with status migrainosus, not intractable - Primary    Overview     Lifelong history of menstrual migraines starting with menarche, progressing over the years to episodic migraine, now at borderline high-frequency episodic/chronic frequency with frequent very long episodes of status migrainosus.  Strong family history of migraine.  Neurological exam is very reassuring as CT head.  Optic discs are normal.  Recent normal eye exam.    There are no red flag features to this episodic pattern, the patient is most likely experiencing central sensitization as a cause of prolonged migraine.  Needs a very strong bridge regimen to break the cycle, we discussed the use of high-dose  prednisone, nerve blocks, Methergine, infusions.  At of these, the most effective, and most cost effective will be prednisone and I provided ample education on this.          Current Assessment & Plan     After discussing indications for toradol and that it will not prevent headache, patient requested toradol as she was already in clinic. Attempted to give IV toradol and able to insert butterfly needle with + flashback however, unable to administer dose of toradol as needle dislodged. Patient educated on prevention and encouraged to continue cymbalta and initiate Emgality. She states she will not start Emgality until after she sees a TMJ specialist next week in [place]. She also reports possible new burning mouth symptoms. She has follow up with Dr. Aguilar 1/28.   Add compazine to help with daily headaches. Not to take same day as phenergan and/or zofran.   Continue supplements for migraine prevention.          Relevant Medications    prochlorperazine (COMPAZINE) 10 MG tablet              Please call our clinic at [phone] or send a message to Dr. Aguilar on the [employer] portal if there are any changes to the plan described below, for example,if you are not contacted for the requested tests, referral(s) within one week, if you are unable to receive the medications prescribed, or if you feel you need to change the treatment course for any reason.     Sign up for portal     TESTING:  -- none     REFERRALS:  -- none    PREVENTION (use daily regardless of headache):  -- continue magnesium in ONE of the following preparations -    1. Magnesium oxide 800mg daily (the most common over the counter kind, may causes loose stools)   2. Magnesium citrate 400-500mg daily (harder to find, but more neutral on the bowels)   3. Magnesium glycinate 400mg daily (hardest to find, look online, but most bowel-neutral, best absorbed)   -- continueriboflavin 400mg daily  -- continuecoenzyme q10 300mg daily (purchase when on sale)  --  alternative, may purchase ONE combination supplement as in the handout     AS-NEEDED TREATMENT (use total no more than 10 days per month unless otherwise stated):  -- continue Maxalt (rizatriptan) at onset of migraine. May repeat every 2 hours. No more than 3 doses per day, 10 days per month  -- limit fioricet to no more than 5-10 tablets per month (for emergencies)  - start compazine. This is a nausea medication that also has anti-migraine properties. Can take daily and will not contribute to rebound headaches      Follow up in 3 weeks (on 1/28/2020) for follow up with Dr. Aguilar.    Yolanda Keller, NP

## 2020-01-07 NOTE — PATIENT INSTRUCTIONS
Please call our clinic at [phone] or send a message to Dr. Aguilar on the [employer] portal if there are any changes to the plan described below, for example,if you are not contacted for the requested tests, referral(s) within one week, if you are unable to receive the medications prescribed, or if you feel you need to change the treatment course for any reason.     Sign up for portal     TESTING:  -- schedule basic lab work (can do today)     REFERRALS:  -- none    PREVENTION (use daily regardless of headache):  -- continue magnesium in ONE of the following preparations -    1. Magnesium oxide 800mg daily (the most common over the counter kind, may causes loose stools)   2. Magnesium citrate 400-500mg daily (harder to find, but more neutral on the bowels)   3. Magnesium glycinate 400mg daily (hardest to find, look online, but most bowel-neutral, best absorbed)   -- continueriboflavin 400mg daily  -- continuecoenzyme q10 300mg daily (purchase when on sale)  -- alternative, may purchase ONE combination supplement as in the handout     AS-NEEDED TREATMENT (use total no more than 10 days per month unless otherwise stated):  -- continue Maxalt (rizatriptan) at onset of migraine. May repeat every 2 hours. No more than 3 doses per day, 10 days per month  -- limit fioricet to no more than 5-10 tablets per month (for emergencies)  - start compazine. This is a nausea medication that also has anti-migraine properties. Can take daily and will not contribute to rebound headaches

## 2020-01-10 ENCOUNTER — PATIENT MESSAGE (OUTPATIENT)
Dept: NEUROLOGY | Facility: CLINIC | Age: 30
End: 2020-01-10

## 2020-01-13 ENCOUNTER — TELEPHONE (OUTPATIENT)
Dept: PHARMACY | Facility: CLINIC | Age: 30
End: 2020-01-13

## 2020-01-13 NOTE — TELEPHONE ENCOUNTER
Initial Emgality consult completed on  . Emgality 240mg (loading dose) will be shipped on 1/15 to arrive at patient's home on  via [employer]. $0.00 copay. Patient intends to start Emgality upon review of therapy from TMJ doctor. Address confirmed. Confirmed 2 patient identifiers - name and . Therapy Appropriate.    Mrs. Vazquez states that she was going to be following up with a provider that was going to assess her TMJ to see if that was the cause of her migraines. Because of this she was hesitant about starting. Explained that Emgality should not interact with any treatment that would be prescribed. Patient agreed to start medication but declined historical consult. Briefly explained how medication should work but patient would like to follow up with pharmacist when she plans to start. She was also hesitant about side effects, which were reviewed and she felt much better about starting.     Indication: Migraine prophlaxis  goals of treatment: reduction in migraine frequency, intensity, and/or duration.     --Injection experience: None, plans to self-inject  Informed patient on online injection video on  website.     Store in refrigerator prior to use (do not freeze, do not shake, keep in original box until use).    Counseled patient on administration directions:  - Dose: Inject two injections (240mg) into the skin as a loading dose, followed by one injection (120mg) every 4 weeks thereafter.   - dose appropriate for diagnosis of Migraine   - Advised patient to keep a calendar to stay compliant.     Patient was counseled on possible side effects:  - Injection site reaction: redness, soreness, itching, bruising, which should resolve within 3-5 days.  - Allergic reactions: itching, rash, hives, swelling of face, mouth, tongue, throat, trouble breathing.   - Informed that there is no data in pregnancy/breastfeeding.     Allergy/Medication Review: Comorbidities, allergies, and medical history on file  reviewed. No DDIs with Emgality.    Consultation included the importance of compliance and of keeping all follow up appointments.  Patient understands to report any medication changes to [facility] and provider. All questions answered and addressed to patients satisfaction. RPh will touchbase with pt in 7 days and [facility] will contact patient in 3 weeks for refills.    Jay Alvarez, PharmD  Clinical Pharmacist  [facility]  P: [phone]    [facility] sent 1/13 @ 11:58PM

## 2020-01-13 NOTE — TELEPHONE ENCOUNTER
Call attempt 1 for Emgality initial consult - LVM and MyChart message sent. Copay $0 at 004.    Memo Augustine, PharmD  Clinical Pharmacist   [facility]   P: [phone]

## 2020-01-28 ENCOUNTER — OFFICE VISIT (OUTPATIENT)
Dept: NEUROLOGY | Facility: CLINIC | Age: 30
End: 2020-01-28
Payer: COMMERCIAL

## 2020-01-28 VITALS
WEIGHT: 158.94 LBS | HEART RATE: 80 BPM | SYSTOLIC BLOOD PRESSURE: 123 MMHG | RESPIRATION RATE: 18 BRPM | HEIGHT: 65 IN | DIASTOLIC BLOOD PRESSURE: 79 MMHG | BODY MASS INDEX: 26.48 KG/M2

## 2020-01-28 DIAGNOSIS — G43.001 MIGRAINE WITHOUT AURA AND WITH STATUS MIGRAINOSUS, NOT INTRACTABLE: Primary | ICD-10-CM

## 2020-01-28 PROCEDURE — 99214 PR OFFICE/OUTPT VISIT, EST, LEVL IV, 30-39 MIN: ICD-10-PCS | Mod: S$GLB,,, | Performed by: PSYCHIATRY & NEUROLOGY

## 2020-01-28 PROCEDURE — 3008F PR BODY MASS INDEX (BMI) DOCUMENTED: ICD-10-PCS | Mod: CPTII,S$GLB,, | Performed by: PSYCHIATRY & NEUROLOGY

## 2020-01-28 PROCEDURE — 99999 PR PBB SHADOW E&M-EST. PATIENT-LVL III: CPT | Mod: PBBFAC,,, | Performed by: PSYCHIATRY & NEUROLOGY

## 2020-01-28 PROCEDURE — 3008F BODY MASS INDEX DOCD: CPT | Mod: CPTII,S$GLB,, | Performed by: PSYCHIATRY & NEUROLOGY

## 2020-01-28 PROCEDURE — 99214 OFFICE O/P EST MOD 30 MIN: CPT | Mod: S$GLB,,, | Performed by: PSYCHIATRY & NEUROLOGY

## 2020-01-28 PROCEDURE — 99999 PR PBB SHADOW E&M-EST. PATIENT-LVL III: ICD-10-PCS | Mod: PBBFAC,,, | Performed by: PSYCHIATRY & NEUROLOGY

## 2020-01-28 NOTE — PROGRESS NOTES
Date of service: 1/28/2020  Referring provider: No ref. provider found    Subjective:      Chief complaint: Establish Care (follow up )       Patient ID: Priscila Vazquez is a 30 y.o. lady with migraines, anxiety, polycystic ovarian syndrome, hypothyroidism, chronic fatigue presenting for follow up of headache    History of Present Illness    INTERVAL HISTORY - 1/28/2020      We did a right TMJ injection on 11/21/19. Emgality was ordered 12/4/19 - has it in her fridge. Saw TMJ dentist, said could be contributing. Will be starting physical therapy at [facility] with Dr. Torres this week on Thursday.     Saw Yolanda Schumacher a couple of weeks ago, compazine ordered.     In the interim, Dr. Han tried her on duloxetine for depression, which she had to get off due to eye side effects. Was on it for 10 days. She does feel it was helping headaches. Pristiq was offered.     Today she reports she is much better. She is not having constant pain anymore and having fewer haedaches. The pain when present is in the temples or the right eye area. Her current pain score is 0 with a range of 0 to 7.     She is also having some burning tongue sensations - improving. Tip of tongue, back of tongue. Roof of mouth. Varies. Caused anxiety. Was tested for B12.     She is on no preventatives.   She is using maxalt and fioricet as needed.       INTERVAL HISTORY - 1/7/2020 (Yolanda Schumacher)     Patient presents for follow up of headache. At initial visit, she was started on a 17-day prednisone bridge (provided some relief), tizanidine for TMJ,and vitamins and minerals for migraine prevention. She was given maxalt and limited fioricet for acute management. Since that time, she has been started on cymbalta (started today) and emgality (has not started).   She initially was seen today for severe headache requesting toradol IV. She was having daily headaches for the past two weeks. At its worst, these headaches were a 6/10 without migranous features. She was  "taking the maxalt and fioricet. These provided relief. She reports current headache 3/10 but concerns for escalation tonight.      ORIGINAL HEADACHE HISTORY - 10/30/2019  Age at onset and course over time:  Patient presenting as an ED follow-up from 10/21/2019 when she went to the ED for persistent headache for 6 weeks.  Was given Toradol, Zofran, Fiorinal, Reglan - caused anxiety.  CT head was negative. Her blood pressure was within normal limits.  Her headaches began at age 14 as menstrual migraines. Over the years gradually became more frequent. In recent times, was doing very well for 4 months. Migraines lasting more days than previous. First week Sept started with mild daily headaches, taking maxalt, then got oral surgery - "cleaned out infection" left over form previous wisdom teeth surgery, surgery was 45 min. Headaches have improved since the ED but still lingering. Family history but no personal history of Gabino's disease. Mom and cousin with severe migraines.   Location:  Right eye, forehead  Quality:  [] pressure [] tight [x] throbbing [] sharp [] stabbing   Severity:  Current 0, bili 0, worse 9  Duration:  Hr  Frequency:  5 days per week, to daily, about 7 pain-free days per month  Headaches awaken at night?:  No  Worst time of day:  Mid day and evening  Associated with: [x] photophobia [x]  phonophobia [] osmophobia [x] blurred vision  [] double vision [x] loss of appetite [x] nausea [] vomiting [] dizziness [] vertigo  [] tinnitus [x] irritability [] sinus pressure [x] problems with concentration   [] neck tightness   Alleviated by:  [x] sleep [x] darkness [] massage [] heat [x] ice [x] medication  Exacerbated by:  [] fatigue [] light [] noise [] smells [x] coughing [] sneezing  [x] bending over [x] ovulation [x] menses [] alcohol [] change in weather [x]  stress  Ipsilateral autonomic: [] nasal congestion [] lacrimation [] ptosis [] injection [] edema [] foreign body sensation [] ear fullness   ICP: "  No transient visual obscurations, presence of low pitched tinnitus - started a few years ago after some detox supplements, she does not think it is related to her migraines, 24/7 bilateral, not pulsatile, non positional  Sleep habits:  She describes for sleep as good  Caffeine intake:  None  Gyn status (if female):  Patient has polycystic ovarian syndrome; she has regular periods; recently started progesterone because it was low, checked by her non [facility] NP; not on any estrogen supplements    MIDAS 70 indicating severe headache related disability    Has changed diet since last week - gluten, sugar-free, veg, fruits     Current acute treatment:  Maxalt 1-2 days per week  Fiorinal - once every 2 weeks     Compazine - has not tried   Zofran    Current prevention:  Emgality - has but has not started    Previously tried/failed acute treatment:  Excedrin migraine    Previously tried/failed preventative treatment:  topiramate - caused eye pain, blurred vision   Duloxetine - tried 10 days, blurred vision but did help headaches     Review of patient's allergies indicates:  No Known Allergies  Current Outpatient Medications   Medication Sig Dispense Refill    galcanezumab-gnlm (EMGALITY PEN) 120 mg/mL PnIj Inject 120 mg into the skin every 28 days. 1 mL 11    LORazepam (ATIVAN) 1 MG tablet Take 1 tablet (1 mg total) by mouth every 12 (twelve) hours as needed for Anxiety. 60 tablet 2    ondansetron (ZOFRAN-ODT) 8 MG TbDL Take 1 tablet (8 mg total) by mouth every 6 (six) hours as needed. 25 tablet 5    prochlorperazine (COMPAZINE) 10 MG tablet Take 1 tablet (10 mg total) by mouth every 6 (six) hours as needed (migraine or nausea). 60 tablet 11    promethazine (PHENERGAN) 25 MG tablet Take 1 tablet (25 mg total) by mouth every 6 (six) hours as needed for Nausea. 25 tablet 5    rizatriptan (MAXALT-MLT) 10 MG disintegrating tablet DISSOLVE 1 TABLET IN MOUTH AT ONSET OF HEADACHE. MAY TAKE 2ND TABLET 2 HOURS LATER. NO MORE  THAN 2 IN 24 HOURS 9 tablet 3    thyroid, pork, (ARMOUR THYROID) 60 mg Tab Take 1 tablet (60 mg total) by mouth once daily. 30 tablet 2    desvenlafaxine succinate (PRISTIQ) 50 MG Tb24 Take 1 tablet (50 mg total) by mouth once daily. 30 tablet 11     No current facility-administered medications for this visit.        Past Medical History  Past Medical History:   Diagnosis Date    Atypical Chest Pain 04/2016     Dr. Jeffy Cartagena Ruled Out GB DX With 2016 U/S    Chronic Fatigue     4/22/16 B12 = Normal; 4/19/16 Extensive Labs Reviewed    Chronic Mild Shortness Of Breath     4/19/16 Ordered Echocardiogram    Family H/O Traver's Disease     Her Mother Has This; 04/2016 Random Cortisol And Cortrosyn Stimulation Test Were Normal    Generalized Arthralgias X 2 Weeks 4/19/16 FBM?    4/22/16 RF, MICHAEL, CPK, ESR = Normal     Generalized Paresthesias     Dr. Guille Cabrera Ruled MS Out 03/2016    Hypothyroidism     On S[place]ock Thyroid; Her Mother Has Hypothyroidism Too    Migraines     Dr. Guille Cabrera; Her Mother Also Has Migraines    Mitral Valve Prolapse Ruled Out     4/22/16 Echocardiogram = Normal But With Thickened MV With Trivial MR    MTHFR Single Mutation     Her Natriopath Tested This 3/7/16    Polycystic Ovarian Syndrome     Dr. Renea Medrano    Situational Anxiety        Past Surgical History  Past Surgical History:   Procedure Laterality Date    MITRAL VALVE SURGERY      None         Family History  Family History   Problem Relation Age of Onset    Migraines Mother     Thyroid disease Mother     Breast cancer Maternal Aunt 60       Social History  Social History     Socioeconomic History    Marital status:      Spouse name: Not on file    Number of children: Not on file    Years of education: Not on file    Highest education level: Not on file   Occupational History    Not on file   Social Needs    Financial resource strain: Not on file    Food insecurity:     Worry: Not on file      Inability: Not on file    Transportation needs:     Medical: Not on file     Non-medical: Not on file   Tobacco Use    Smoking status: Never Smoker    Smokeless tobacco: Never Used   Substance and Sexual Activity    Alcohol use: No    Drug use: No    Sexual activity: Never   Lifestyle    Physical activity:     Days per week: Not on file     Minutes per session: Not on file    Stress: Not on file   Relationships    Social connections:     Talks on phone: Not on file     Gets together: Not on file     Attends [employer] service: Not on file     Active member of club or organization: Not on file     Attends meetings of clubs or organizations: Not on file     Relationship status: Not on file   Other Topics Concern    Not on file   Social History Narrative    Not on file        Review of Systems  14-point review of systems as follows:   No check ynes indicates NEGATIVE response   Constitutional: [] weight loss, [] change to appetite   Eyes: [] change in vision, [] double vision   Ears, nose, mouth, throat: [] frequent nose bleeds, [] ringing in the ears   Respiratory: [] cough, [] wheezing   Cardiovascular: [] chest pain, [] palpitations   Gastrointestinal: [] jaundice, [] nausea/vomiting   Genitourinary: [] incontinence, [] burning with urination   Hematologic/lymphatic: [] easy bruising/bleeding, [] night sweats   Neurological: [] numbness, [] weakness   Endocrine: [] fatigue, [] heat/cold intolerance   Allergy/Immunologic: [] fevers, [] chills   Musculoskeletal: [] muscle pain, [] joint pain   Psychiatric: [] thoughts of harming self/others, [x] depression   Integumentary: [] rashes, [] sores that do not heal     Objective:        Vitals:    01/28/20 1536   BP: 123/79   Pulse: 80   Resp: 18     Body mass index is 26.45 kg/m².    10/30/19   Constitutional: appears in no acute distress, well-developed, well-nourished     Eyes: normal conjunctiva, PERRLA    Ears, nose, mouth, throat: external appearance of  ears and nose normal, hearing intact   Tongue scalloped      Psychiatric: normal judgment and insight. Oriented to person, place, and time.     Data Review:     I have personally reviewed the referring provider's notes, labs, & imaging made available to me today.      RADIOLOGY STUDIES:  I have personally reviewed the pertinent images performed.       Results for orders placed or performed during the hospital encounter of 10/21/19   CT Head Without Contrast    Narrative    EXAMINATION:  CT HEAD WITHOUT CONTRAST    CLINICAL HISTORY:  Headache, acute, norm neuro exam;    TECHNIQUE:  Axial images of the head were obtained without IV contrast administration.  Coronal and sagittal reconstructions were provided.  Three dimensional and MIP images were obtained and evaluated.  Total DLP was 820.64 mGy-cm. Dose lowering technique and automated exposure control were utilized for this exam.    COMPARISON:  CT of the head 01/02/2013.    FINDINGS:  There is normal brain formation.  There is normal gray-white matter differentiation.  There is no hemorrhage, hydrocephalus, or midline shift.  There is no cytotoxic or vasogenic edema.  There is no intra or extra-axial fluid collection.  There is no herniation.    The calvarium is intact.  There is no fracture.  The bilateral orbits are normal.  The paranasal sinuses and mastoid air cells are normally developed and well aerated.      Impression    No acute intracranial abnormality.      Electronically signed by: Albino Win MD  Date:    10/21/2019  Time:    14:41       Lab Results   Component Value Date     10/30/2019    K 4.2 10/30/2019     10/30/2019    CO2 28 10/30/2019    BUN 9 10/30/2019    CREATININE 0.8 10/30/2019    GLU 91 10/30/2019    AST 15 10/30/2019    AST 15 04/12/2016    ALT 11 10/30/2019    ALBUMIN 4.6 10/30/2019    PROT 7.9 10/30/2019    BILITOT 0.5 10/30/2019    CHOL 147 04/22/2016    HDL 44 04/22/2016    LDLCALC 91.0 04/22/2016    TRIG 60 04/22/2016        Lab Results   Component Value Date    WBC 8.49 10/30/2019    HGB 14.1 10/30/2019    HCT 41.6 10/30/2019    MCV 90 10/30/2019     10/30/2019       Lab Results   Component Value Date    TSH 0.350 (L) 06/28/2017           Assessment & Plan:       Problem List Items Addressed This Visit        Neuro    Migraine without aura and with status migrainosus, not intractable - Primary    Overview     Lifelong history of menstrual migraines starting with menarche, progressing over the years to episodic migraine, now at borderline high-frequency episodic/chronic frequency with frequent very long episodes of status migrainosus.  Strong family history of migraine.  Neurological exam is very reassuring as CT head.  Optic discs are normal.  Recent normal eye exam.    There are no red flag features to this episodic pattern, the patient is most likely experiencing central sensitization as a cause of prolonged migraine.  Needs a very strong bridge regimen to break the cycle, we discussed the use of high-dose prednisone, nerve blocks, Methergine, infusions.  At of these, the most effective, and most cost effective will be prednisone and I provided ample education on this.                    Please call our clinic at [phone] or send a message to Dr. Aguilar on the [employer] portal if there are any changes to the plan described below, for example,if you are not contacted for the requested tests, referral(s) within one week, if you are unable to receive the medications prescribed, or if you feel you need to change the treatment course for any reason.     TESTING:  -- none     REFERRALS:  -- none    PREVENTION (use daily regardless of headache):  -- continue magnesium glycinate   -- strongly recommend starting Emgality for the reason that realistically, it will take 3 treatments before significant relief is seen     AS-NEEDED TREATMENT (use total no more than 10 days per month unless otherwise stated):  -- continue Maxalt  (rizatriptan) at onset of migraine. May repeat every 2 hours. No more than 3 doses per day, 10 days per month  -- limit fioricet to no more than 5-10 tablets per month (for emergencies)  -- continue Compazine 10 mg, half or full tablet up to 4 times a day as needed for mild headache (nausea pill that also helps headache)    Handout on avoiding medication overuse and medications safe in pregnancy and lactation below       Follow up in about 2 months (around 3/28/2020) for office visit.    Rere Aguilar MD

## 2020-01-28 NOTE — PATIENT INSTRUCTIONS
"  Please call our clinic at [phone] or send a message to Dr. Aguilar on the [employer] portal if there are any changes to the plan described below, for example,if you are not contacted for the requested tests, referral(s) within one week, if you are unable to receive the medications prescribed, or if you feel you need to change the treatment course for any reason.     TESTING:  -- please message me with a photo of your recent lab work     REFERRALS:  -- none    PREVENTION (use daily regardless of headache):  -- continue magnesium glycinate   -- strongly recommend starting Emgality for the reason that realistically, it will take 3 treatments before significant relief is seen     AS-NEEDED TREATMENT (use total no more than 10 days per month unless otherwise stated):  -- continue Maxalt (rizatriptan) at onset of migraine. May repeat every 2 hours. No more than 3 doses per day, 10 days per month  -- limit fioricet to no more than 5-10 tablets per month (for emergencies)  -- continue Compazine 10 mg, half or full tablet up to 4 times a day as needed for mild headache (nausea pill that also helps headache)    Handout on avoiding medication overuse and medications safe in pregnancy and lactation below     -------------------------------------------------------------------------------------------------------------------------------    "Medication Overuse Headache" is a separate headache diagnosis that occurs ONLY when people already have a baseline headache disorder (typically chronic migraine) AND they treat headaches with over the counter or prescription as-needed medications too frequently (examples - ibuprofen, Excedrin, Imitrex, Fioricet etc). It takes VERY LITTLE medication usage before the threshold for "too much" is crossed -  10 treatment days per month is considered the upper safe limit for most medications. For medications like Fioricet, "rebound" headaches can occur with as little as 5 tablets per month. For " "most medications, how much you take in one day is less important than how many days per month you take it.     Medication overuse headache represents actual chemical changes in the brain that make it IMPOSSIBLE for your brain to NOT have a headache without the "offending" medication present, but eventually, even these medications that worked previously stop working. The only way this condition, and the underlying migraines/headaches, can be successfully treated is to completely stop/wean off the medication that caused the problem in the first place. This will cause headaches to WORSEN in the short term as your brain experiences withdrawal, but ultimately once withdrawal is passed, the headaches will be MUCH better in most cases once medication was successfully stopped. As the withdrawal process can be very uncomfortable, you may be prescribed a temporary "bridge" medication (a short course of powerful pain-relievers) or "bridge" procedure (like a nerve block) to comfortably undergo the weaning process.     You should always let us know if you cannot tolerate the weaning process despite the bridge treatment, especially before you resume any frequent use of pain medications.     Gong forward, we ask that you use the recommended as-needed treatments ONLY 10 days per month. If you have 2 as needed medications, their combination should not exceed 10 days per month (I.e. If you use ibuprofen AND sumatriptan, you may use both together 10 days per month, or each separately 5 days per month - 5 days sumatriptan and 5 days ibuprofen). In other words, you should only treat your headaches with "true" pain relievers on 10 days per month, and on the remaining 20 days per month you may only use non-medication approaches (heat, ice, massage, acupuncture, physical therapy, relaxation techniques, biofeedback, compound creams) or medications that are allowed to be used daily (such as lidocaine nasal spray, Ausanil nasal spray, " "Compazine, Reglan, Phenergan, gabapentin, Periactin, muscle relaxers, benadryl). Following these recommendations will ensure you do not return to a pattern of "medication overuse headache" after a successful wean off.     ------------------    Pregnancy and Migraine Control     If you are planning to have a baby, or are already pregnant, you may worry about whether the medications you take for migraine control could harm the baby, or how you will manage your migraines without medication for 9 months.  These are normal and common worries. The greatest thing in your favor is that for most (about 2/3rds) of women, migraines improve during pregnancy, particularly in the second and third trimesters. Breastfeeding after pregnancy can also be "protective" against migraines for some women. For those women who continue to have severe headaches during pregnancy, or while you are trying to become pregnant, as with other medical conditions, the non-pharmacologic "natural" route is preferred for treatment during pregnancy. Avoiding your usual triggers (food, odors etc), adequate and regular sleep, regular meals, exercise, massage, and relaxation become particularly important. After those measures, the next safest measures are to use "nutraceuticals," or in other words,    · magnesium oxide 400mg twice a day or   · Riboflavin 400mg once daily to prevent headaches    If prescription medication must be used due to the refractory nature of the headaches, we are limited with choices that are safe for the fetus but we DO have some. Medications are classified according to an A through D system to describe their safety.    A: There are human studies showing the drug is safe (no medication for headaches is class A)  B: Animals studies show it is safe, but there are no adequate human studies  C: Animal studies have shown potential harm, there are no adequate human studies, but risk to fetus can outweigh benefit  D: Evidence of human fetal " risk based on reported experience, some use may be justified   X: medication should never be used because the risk to fetus outweighs any benefit       Preventative medications considered safe in pregnancy include:    Medication  Pregnancy Category   Safe for breastfeeding   Cyproheptadine  B Can lower milk supply   Memantine  B Effect unknown    Propranolol C Yes   Labetalol C Yes   Nadolol  C No    Timolol C Variable    Metoprolol  C yes   Atenelol  C No    Amitriptyline  C Yes, monitor for rare sedation   Gabapentin  C Yes, monitor for sedation       For as needed treatment, the safest options (category B) include:    · Acetaminophen (tylenol)  · Caffeine  · Diphenhydramine (benadryl)  · Metaclopramide (reglan)  · Ondansetron (zofran)  · Lidocaine (injections)     Less preferred bu acceptable agents (category C) for rescue include:    · NSAIDs (ibuprofen, naproxen) - 2nd trimester - 30weeks, if OK with OBGYN   · Prochlorperazine (compazine)  · Butalbital  · Narcotics  · Triptans - a study of more than 500 women exposed to sumatriptan during pregnancy found no major problems for the fetus, so if a triptan was taken prior to knowing about the pregnancy, rest assured this is Ok!     The following medications need to be avoided in pregnancy because of known risks to the fetus:    · NSAIDs (after 30 weeks)  · DHE  · Midrin   · Benzodiazepines  · Butorphanol   · Aspirin (2nd and 3rd trimesters)  · Valproic acid (depakote)  · Topiramate (topamax)  · Imipramine  · Lithium  · Paroxetine

## 2020-02-04 ENCOUNTER — PATIENT MESSAGE (OUTPATIENT)
Dept: NEUROLOGY | Facility: CLINIC | Age: 30
End: 2020-02-04

## 2020-02-04 RX ORDER — KETOROLAC TROMETHAMINE 10 MG/1
TABLET, FILM COATED ORAL
Qty: 20 TABLET | Refills: 3 | Status: SHIPPED | OUTPATIENT
Start: 2020-02-04 | End: 2020-09-14

## 2020-02-05 ENCOUNTER — PROCEDURE VISIT (OUTPATIENT)
Dept: NEUROLOGY | Facility: CLINIC | Age: 30
End: 2020-02-05
Payer: COMMERCIAL

## 2020-02-05 VITALS
WEIGHT: 158 LBS | HEIGHT: 65 IN | DIASTOLIC BLOOD PRESSURE: 83 MMHG | RESPIRATION RATE: 16 BRPM | SYSTOLIC BLOOD PRESSURE: 123 MMHG | BODY MASS INDEX: 26.33 KG/M2 | HEART RATE: 103 BPM

## 2020-02-05 DIAGNOSIS — M54.81 OCCIPITAL NEURALGIA OF RIGHT SIDE: Primary | ICD-10-CM

## 2020-02-05 DIAGNOSIS — M79.18 CERVICAL MYOFASCIAL PAIN SYNDROME: ICD-10-CM

## 2020-02-05 PROCEDURE — 20553 PR INJECT TRIGGER POINTS, > 3: ICD-10-PCS | Mod: S$GLB,,, | Performed by: PSYCHIATRY & NEUROLOGY

## 2020-02-05 PROCEDURE — 64405 PR NERVE BLOCK INJ, ANES/STEROID, OCCIPITAL: ICD-10-PCS | Mod: 59,RT,S$GLB, | Performed by: PSYCHIATRY & NEUROLOGY

## 2020-02-05 PROCEDURE — 64450 NJX AA&/STRD OTHER PN/BRANCH: CPT | Mod: 59,RT,, | Performed by: PSYCHIATRY & NEUROLOGY

## 2020-02-05 PROCEDURE — 64450 PR NERVE BLOCK INJ, ANES/STEROID, OTHER PERIPHERAL: ICD-10-PCS | Mod: 59,RT,, | Performed by: PSYCHIATRY & NEUROLOGY

## 2020-02-05 PROCEDURE — 20553 NJX 1/MLT TRIGGER POINTS 3/>: CPT | Mod: S$GLB,,, | Performed by: PSYCHIATRY & NEUROLOGY

## 2020-02-05 PROCEDURE — 64405 NJX AA&/STRD GR OCPL NRV: CPT | Mod: 59,RT,S$GLB, | Performed by: PSYCHIATRY & NEUROLOGY

## 2020-02-05 RX ORDER — TRIAMCINOLONE ACETONIDE 40 MG/ML
40 INJECTION, SUSPENSION INTRA-ARTICULAR; INTRAMUSCULAR ONCE
Status: COMPLETED | OUTPATIENT
Start: 2020-02-05 | End: 2020-02-05

## 2020-02-05 RX ORDER — BUPIVACAINE HYDROCHLORIDE 2.5 MG/ML
12 INJECTION, SOLUTION EPIDURAL; INFILTRATION; INTRACAUDAL ONCE
Status: COMPLETED | OUTPATIENT
Start: 2020-02-05 | End: 2020-02-05

## 2020-02-05 RX ORDER — LIDOCAINE HYDROCHLORIDE 10 MG/ML
8 INJECTION, SOLUTION EPIDURAL; INFILTRATION; INTRACAUDAL; PERINEURAL ONCE
Status: COMPLETED | OUTPATIENT
Start: 2020-02-05 | End: 2020-02-05

## 2020-02-05 RX ADMIN — TRIAMCINOLONE ACETONIDE 40 MG: 40 INJECTION, SUSPENSION INTRA-ARTICULAR; INTRAMUSCULAR at 12:02

## 2020-02-05 RX ADMIN — BUPIVACAINE HYDROCHLORIDE 30 MG: 2.5 INJECTION, SOLUTION EPIDURAL; INFILTRATION; INTRACAUDAL at 12:02

## 2020-02-05 RX ADMIN — LIDOCAINE HYDROCHLORIDE 80 MG: 10 INJECTION, SOLUTION EPIDURAL; INFILTRATION; INTRACAUDAL; PERINEURAL at 12:02

## 2020-02-05 NOTE — PROCEDURES
Procedures     PROCEDURE #1     Greater and Lesser Occipital Nerve Block, right    Diagnosis: occipital neuralgia     After risks and benefits were explained including bleeding, infection, worsening of the pain, damage to the area being injected, weakness, allergic reaction to medications, vascular injection, and nerve damage, signed consent was obtained. All questions were answered.     The area of the greater occipital nerve was identified as a point 1/3rds the distance  between the occipital protuberance and the ipsilateral mastoid process. The area of the lesser occipital nerve was identified as a point 2/3rds the distance between the occipital protuberance and the ipsilateral mastoid process.The identified areas were prepped three times with alcohol and the alcohol allowed to dry. Next, a 27 gauge 1 inch needle was placed in the anatomical area of the AMANDA. Once reproduction of the pain was elicited and negative aspiration confirmed, I proceeded with the injection. This was repeated for the DOMONIQUE. The exact procedure was repeated on the contralateral side.     Adequacy of the block was confirmed by sensory examination demonstrating anesthesia in the territory of the AMANDA and the DOMONIQUE.     Medication used: Marcaine 0.25% (60%), lidocaine 1% (40%) and Triamcinolone 20mg     Total volume injected:  5cc    Estimated blood loss: none    The patient tolerated the procedure well and there were no complications.     -----------------------------------------------------------------------------------------------------------------    PROCEDURE #2     TRIGGER POINT INJECTION (CERVICAL), 3 muscles right     Indication: cervical myofascial pain     Anesthesia: none     After risks and benefits were explained including bleeding, infection, worsening of the pain, damage to the area being injected, weakness, allergic reaction to medications, vascular injection, and nerve damage, signed consent was obtained. All questions were  answered.     Trigger points were identified by palpation of tight muscle fibers with reproduction of patient's pain and referral pattern upon palpation. The identified areas were prepped three times with alcohol and the alcohol allowed to dry. Next, a 27 gauge 1 inch needle was placed in the anatomical area of the trigger point ot be injected. Once negative aspiration of air and heme was confirmed, I proceeded with the injection.     Medications used: bupivicaine 0.25% (60%), lidocaine 1% (40%) and Triamcinolone 20mg     Total volume injected: 10cc     Trigger points injected:  -- right frontalis (without steroid)  -- right splenius capitus  -- right upper trapezius    Estimated blood loss: none     The patient did tolerate the procedure well and there were no complications.    -------------------------------------    Total doses:  Bupivicaine: 30mg  Lidocaine: 80mg  Triamcinolone: 40mg    RTC as scheduled

## 2020-02-06 ENCOUNTER — PATIENT MESSAGE (OUTPATIENT)
Dept: NEUROLOGY | Facility: CLINIC | Age: 30
End: 2020-02-06

## 2020-02-19 ENCOUNTER — TELEPHONE (OUTPATIENT)
Dept: PHARMACY | Facility: CLINIC | Age: 30
End: 2020-02-19

## 2020-03-03 ENCOUNTER — TELEPHONE (OUTPATIENT)
Dept: NEUROLOGY | Facility: CLINIC | Age: 30
End: 2020-03-03

## 2020-03-03 ENCOUNTER — TELEPHONE (OUTPATIENT)
Dept: PHARMACY | Facility: CLINIC | Age: 30
End: 2020-03-03

## 2020-03-03 NOTE — TELEPHONE ENCOUNTER
----- Message from Francine Berry, Nol[place] sent at 3/3/2020  3:05 PM CST -----  Regarding: Emgality - patient not starting  Dr. Aguilar and Staff,    Ms. Vazquez stated she does not want to start Emgality at this time and requested we close her out of [facility]. We will close her referral as requested. Please let us know if anything changes in the future or if we can further assist with her care in any way.    Thank you,  Francine Berry, PharmD, BCACP  [facility]  [phone]

## 2020-03-03 NOTE — TELEPHONE ENCOUNTER
The patient called [facility] to state she does not want to take Emgality and does not want any further calls from [facility]. Let her know we will close her out of the pharmacy as requested but she can reach out if she changes her mind. She verbalized understanding. **Message sent to Dr. Aguilar's office to relay conversation**

## 2020-03-09 ENCOUNTER — PATIENT MESSAGE (OUTPATIENT)
Dept: NEUROLOGY | Facility: CLINIC | Age: 30
End: 2020-03-09

## 2020-03-09 DIAGNOSIS — F45.8 BRUXISM: ICD-10-CM

## 2020-03-09 DIAGNOSIS — M26.69 TMJ CAPSULITIS: Primary | ICD-10-CM

## 2020-03-09 DIAGNOSIS — G43.001 MIGRAINE WITHOUT AURA AND WITH STATUS MIGRAINOSUS, NOT INTRACTABLE: ICD-10-CM

## 2020-03-30 ENCOUNTER — TELEPHONE (OUTPATIENT)
Dept: NEUROLOGY | Facility: CLINIC | Age: 30
End: 2020-03-30

## 2020-03-31 ENCOUNTER — TELEPHONE (OUTPATIENT)
Dept: NEUROLOGY | Facility: CLINIC | Age: 30
End: 2020-03-31

## 2020-03-31 NOTE — TELEPHONE ENCOUNTER
Called patient and offered virtual visit but patient stated she was doing fine and will just wait to be seen in person. Informed patient we would call back to get her scheduled once we know of a date we can see patients in clinic again. Patient expressed understanding.

## 2020-04-17 ENCOUNTER — TELEPHONE (OUTPATIENT)
Dept: NEUROLOGY | Facility: CLINIC | Age: 30
End: 2020-04-17

## 2020-04-17 NOTE — TELEPHONE ENCOUNTER
Called patient and offered virtual visit but patient stated she is fine and to reschedule to June. Appointment rescheduled and patient expressed understanding

## 2020-09-03 ENCOUNTER — OFFICE VISIT (OUTPATIENT)
Dept: NEUROLOGY | Facility: CLINIC | Age: 30
End: 2020-09-03
Payer: COMMERCIAL

## 2020-09-03 VITALS
BODY MASS INDEX: 27.15 KG/M2 | WEIGHT: 163.13 LBS | DIASTOLIC BLOOD PRESSURE: 75 MMHG | SYSTOLIC BLOOD PRESSURE: 115 MMHG | HEART RATE: 96 BPM

## 2020-09-03 DIAGNOSIS — G43.001 MIGRAINE WITHOUT AURA AND WITH STATUS MIGRAINOSUS, NOT INTRACTABLE: ICD-10-CM

## 2020-09-03 DIAGNOSIS — M26.69 TMJ CAPSULITIS: Primary | ICD-10-CM

## 2020-09-03 PROCEDURE — 3008F BODY MASS INDEX DOCD: CPT | Mod: CPTII,S$GLB,, | Performed by: PSYCHIATRY & NEUROLOGY

## 2020-09-03 PROCEDURE — 99999 PR PBB SHADOW E&M-EST. PATIENT-LVL III: CPT | Mod: PBBFAC,,, | Performed by: PSYCHIATRY & NEUROLOGY

## 2020-09-03 PROCEDURE — 99213 OFFICE O/P EST LOW 20 MIN: CPT | Mod: S$GLB,,, | Performed by: PSYCHIATRY & NEUROLOGY

## 2020-09-03 PROCEDURE — 99999 PR PBB SHADOW E&M-EST. PATIENT-LVL III: ICD-10-PCS | Mod: PBBFAC,,, | Performed by: PSYCHIATRY & NEUROLOGY

## 2020-09-03 PROCEDURE — 3008F PR BODY MASS INDEX (BMI) DOCUMENTED: ICD-10-PCS | Mod: CPTII,S$GLB,, | Performed by: PSYCHIATRY & NEUROLOGY

## 2020-09-03 PROCEDURE — 99213 PR OFFICE/OUTPT VISIT, EST, LEVL III, 20-29 MIN: ICD-10-PCS | Mod: S$GLB,,, | Performed by: PSYCHIATRY & NEUROLOGY

## 2020-09-03 NOTE — PATIENT INSTRUCTIONS
Please call our clinic at [phone] or send a message to Dr. Aguilar on the [employer] portal if there are any changes to the plan described below, for example,if you are not contacted for the requested tests, referral(s) within one week, if you are unable to receive the medications prescribed, or if you feel you need to change the treatment course for any reason.     TESTING:  -- none     REFERRALS:  -- none    PREVENTION (use daily regardless of headache):  -- consider migraine vitamins    AS-NEEDED TREATMENT (use total no more than 10 days per month unless otherwise stated):  -- continue Maxalt (rizatriptan) at onset of migraine. May repeat every 2 hours. No more than 3 doses per day, 10 days per month  -- toradol and fioricet for rescue if maxalt did not work   -- limit fioricet to no more than 5-10 tablets per month (for emergencies)    Follow up as needed with Yolanda Schumacher in the fall (nerve block etc)  Call in Dec or Jan to establish care with Dr. Miri Sage     -------------------    Vitamins and Dietary Supplements for Headache Prevention     Certain vitamins and food supplements may help prevent headaches when taken regularly. The best evidence exists for the agents below (from small but reasonably well-designed published trials). Side effects are typically mild. Optimal doses are unknown, but are likely higher than found in typical multi-vitamins. Be wary of any unsubstantiated claims of spectacular benefits.       B2/Riboflavin - up to 400mg / day   Magnesium oxide- up to 400mg 2x / day (diarrhea possible) or Magnesium citrate 600mg once per day or Magensium glycinate 400mg per day   Coenzyme Q10 - up to 100mg 3x / day (expensive)   Butterburr (Petasites hybridus) extract, Petadolex brand (pyrrolizidine alkaloid free), 50-75mg twice a day with food (expensive), http://www.petadolexCarmenta Bioscience.   Feverfew (Parthenium integrifolium) 50mg+ / day (inexpensive, weaker / contradictory evidence for effectiveness)    Melatonin - There is some evidence that this sleep hormone may help headaches. 3mg has been shown to be helpful in migraine, up to 9mg in cluster headaches. It should be taken ~1 hour before desired bedtime. Significant side effects are rare, but it may cause odd dreams or disruption of normal sleep-wake cycles. Higher doses (up to 24mg or so) may be useful as a substitute for indomethacin (the two molecules are chemically similar), a strong pain reliever effective for certain rare types of headache.       Migraine Multivitamins. There are a companies that package more than one of the above vitamins / supplements into a single pill. Some are available directly from , but most are available from a vitamin catalogues or websites:   DolAdfacest, info. Available at http://www.[employer].com (multivitamin with extra B2, Magnesium, CoEnzyme Q10)   HeadacheFree, http://www.headachefreevitamins.com (multivitamin, but with higher amounts of B2 and magnesium)   MigraDefense (contains magnesium, butterburr, and feverfew, but also contains supplements that have not been tested in trials for migraine)   MigreLief, http://www.migrelief.com (contains B2, magnesium, feverfew)   MigraMAXX (contains B2, magnesium, and butterbur)   Migravent, info. available at http://www.migravent.[employer]og (contains B2, magnesium, CoEnzyme Q10, butterburr)   Migrasolve (contains B2 and butterbur).       We do not specifically endorse any brand name item, nor do we have any financial interest in any of these products.     Be aware that in the [place] vitamins and food supplements are not FDA-regulated. Thus there is absolutely no guarantee that what you are buying contains the listed ingredients or doses. One study from Consumer Reports indicates that the least expensive brands of supplements are often the ones that skimp on the active ingredients.

## 2020-09-03 NOTE — PROGRESS NOTES
"Date of service: 9/3/2020  Referring provider: No ref. provider found    Subjective:      Chief complaint: Headache       Patient ID: Priscila Vazquez is a 30 y.o. lady with migraines, anxiety, polycystic ovarian syndrome, hypothyroidism, chronic fatigue presenting for follow up of headache    History of Present Illness    INTERVAL HISTORY - 9/3/2020    The patient was last seen about 8 months ago at which point she was having fewer headaches. I performed a right occipital nerve block and right sided cervical trigger point injections on 2/5/2020.    In the interim she has consulted with "[facility]" (Dr. Jean) - had trigger point injections, three different mouth pieces made - "rough" experience, gave up on this for now     She has been in PT with Michaelle Urbano for her TMJ - this has helped tremendously. She has modified her diet as well to more soft food. [facility].     Today she reports she is doing a little better.  She is having fewer migraines.  When present they are normally on the right side.  Her current pain score is 0 with a range of 0-6.  She is having headaches 1-2 days a week.  She is taking Maxalt, Toradol, Fioricet 1-2 days a week.    She is taking the magnesium on and off.       INTERVAL HISTORY - 1/28/2020      We did a right TMJ injection on 11/21/19. Emgality was ordered 12/4/19 - has it in her fridge. Saw TMJ dentist, said could be contributing. Will be starting physical therapy at [place] with Dr. Torres this week on Thursday.     Saw Yolanda Schumacher a couple of weeks ago, compazine ordered.     In the interim, Dr. Han tried her on duloxetine for depression, which she had to get off due to eye side effects. Was on it for 10 days. She does feel it was helping headaches. Pristiq was offered.     Today she reports she is much better. She is not having constant pain anymore and having fewer haedaches. The pain when present is in the temples or the right eye area. Her current pain " "score is 0 with a range of 0 to 7.     She is also having some burning tongue sensations - improving. Tip of tongue, back of tongue. Roof of mouth. Varies. Caused anxiety. Was tested for B12.     She is on no preventatives.   She is using maxalt and fioricet as needed.       INTERVAL HISTORY - 1/7/2020 (Yolanda Schumacher)     Patient presents for follow up of headache. At initial visit, she was started on a 17-day prednisone bridge (provided some relief), tizanidine for TMJ,and vitamins and minerals for migraine prevention. She was given maxalt and limited fioricet for acute management. Since that time, she has been started on cymbalta (started today) and emgality (has not started).   She initially was seen today for severe headache requesting toradol IV. She was having daily headaches for the past two weeks. At its worst, these headaches were a 6/10 without migranous features. She was taking the maxalt and fioricet. These provided relief. She reports current headache 3/10 but concerns for escalation tonight.      ORIGINAL HEADACHE HISTORY - 10/30/2019  Age at onset and course over time:  Patient presenting as an ED follow-up from 10/21/2019 when she went to the ED for persistent headache for 6 weeks.  Was given Toradol, Zofran, Fiorinal, Reglan - caused anxiety.  CT head was negative. Her blood pressure was within normal limits.  Her headaches began at age 14 as menstrual migraines. Over the years gradually became more frequent. In recent times, was doing very well for 4 months. Migraines lasting more days than previous. First week Sept started with mild daily headaches, taking maxalt, then got oral surgery - "cleaned out infection" left over form previous wisdom teeth surgery, surgery was 45 min. Headaches have improved since the ED but still lingering. Family history but no personal history of Osborne's disease. Mom and cousin with severe migraines.   Location:  Right eye, forehead  Quality:  [] pressure [] tight [x] " throbbing [] sharp [] stabbing   Severity:  Current 0, bili 0, worse 9  Duration:  Hr  Frequency:  5 days per week, to daily, about 7 pain-free days per month  Headaches awaken at night?:  No  Worst time of day:  Mid day and evening  Associated with: [x] photophobia [x]  phonophobia [] osmophobia [x] blurred vision  [] double vision [x] loss of appetite [x] nausea [] vomiting [] dizziness [] vertigo  [] tinnitus [x] irritability [] sinus pressure [x] problems with concentration   [] neck tightness   Alleviated by:  [x] sleep [x] darkness [] massage [] heat [x] ice [x] medication  Exacerbated by:  [] fatigue [] light [] noise [] smells [x] coughing [] sneezing  [x] bending over [x] ovulation [x] menses [] alcohol [] change in weather [x]  stress  Ipsilateral autonomic: [] nasal congestion [] lacrimation [] ptosis [] injection [] edema [] foreign body sensation [] ear fullness   ICP:  No transient visual obscurations, presence of low pitched tinnitus - started a few years ago after some detox supplements, she does not think it is related to her migraines, 24/7 bilateral, not pulsatile, non positional  Sleep habits:  She describes for sleep as good  Caffeine intake:  None  Gyn status (if female):  Patient has polycystic ovarian syndrome; she has regular periods; recently started progesterone because it was low, checked by her non [facility] NP; not on any estrogen supplements    MIDAS 70 indicating severe headache related disability    Has changed diet since last week - gluten, sugar-free, veg, fruits     Current acute treatment:  Maxalt 10mg ODT 1-2 days per week (uses first line, toradol and toradol second line)  Ketoralac 10 mg tablet   Fioricet (two tabs) - once every 2 weeks   Zofran 8mg ODT     Current prevention:  None     Previously tried/failed acute treatment:  Excedrin migraine    Previously tried/failed preventative treatment:  topiramate - caused eye pain, blurred vision   Duloxetine - tried 10 days, blurred  vision but did help headaches   Desvenlafaxine 50mg       Review of patient's allergies indicates:  No Known Allergies  Current Outpatient Medications   Medication Sig Dispense Refill    ketorolac (TORADOL) 10 mg tablet 2 tab PO BID prn headache, no more than 2 days per week 20 tablet 3    LORazepam (ATIVAN) 1 MG tablet TAKE 1 TABLET (1 MG TOTAL) BY MOUTH EVERY 12 (TWELVE) HOURS AS NEEDED FOR ANXIETY. 60 tablet 2    ondansetron (ZOFRAN-ODT) 8 MG TbDL Take 1 tablet (8 mg total) by mouth every 6 (six) hours as needed. 25 tablet 5    rizatriptan (MAXALT-MLT) 10 MG disintegrating tablet DISSOLVE 1 TABLET IN MOUTH AT ONSET OF HEADACHE, MAY DISSOLVE 2ND TABLET TWO HOURS LATER. NO MORE THAN 2 IN 24 HOURS 9 tablet 3    thyroid, pork, (ARMOUR THYROID) 60 mg Tab Take 1 tablet (60 mg total) by mouth once daily. 90 tablet 3    desvenlafaxine succinate (PRISTIQ) 50 MG Tb24 Take 1 tablet (50 mg total) by mouth once daily. (Patient not taking: Reported on 9/3/2020) 30 tablet 11    galcanezumab-gnlm (EMGALITY PEN) 120 mg/mL PnIj Inject 120 mg into the skin every 28 days. (Patient not taking: Reported on 9/3/2020) 1 mL 11    prochlorperazine (COMPAZINE) 10 MG tablet Take 1 tablet (10 mg total) by mouth every 6 (six) hours as needed (migraine or nausea). (Patient not taking: Reported on 9/3/2020) 60 tablet 11    promethazine (PHENERGAN) 25 MG tablet Take 1 tablet (25 mg total) by mouth every 6 (six) hours as needed for Nausea. (Patient not taking: Reported on 9/3/2020) 25 tablet 5     No current facility-administered medications for this visit.        Past Medical History  Past Medical History:   Diagnosis Date    Atypical Chest Pain 04/2016     Dr. Jeffy Cartagena Ruled Out GB DX With 2016 U/S    Chronic Fatigue     4/22/16 B12 = Normal; 4/19/16 Extensive Labs Reviewed    Chronic Mild Shortness Of Breath     4/19/16 Ordered Echocardiogram    Family H/O Columbia's Disease     Her Mother Has This; 04/2016 Random Cortisol And  Cortrosyn Stimulation Test Were Normal    Generalized Arthralgias X 2 Weeks 4/19/16 FBM?    4/22/16 RF, MICHAEL, CPK, ESR = Normal     Generalized Paresthesias     Dr. Guille Cabrera Ruled MS Out 03/2016    Hypothyroidism     On [place] Thyroid; Her Mother Has Hypothyroidism Too    Migraines     Dr. Guille Cabrera; Her Mother Also Has Migraines    Mitral Valve Prolapse Ruled Out     4/22/16 Echocardiogram = Normal But With Thickened MV With Trivial MR    MTHFR Single Mutation     Her Natriopath Tested This 3/7/16    Polycystic Ovarian Syndrome     Dr. Renea Medrano    Situational Anxiety        Past Surgical History  Past Surgical History:   Procedure Laterality Date    MITRAL VALVE SURGERY      None         Family History  Family History   Problem Relation Age of Onset    Migraines Mother     Thyroid disease Mother     Breast cancer Maternal Aunt 60       Social History  Social History     Socioeconomic History    Marital status:      Spouse name: Not on file    Number of children: Not on file    Years of education: Not on file    Highest education level: Not on file   Occupational History    Not on file   Social Needs    Financial resource strain: Not on file    Food insecurity     Worry: Not on file     Inability: Not on file    Transportation needs     Medical: Not on file     Non-medical: Not on file   Tobacco Use    Smoking status: Never Smoker    Smokeless tobacco: Never Used   Substance and Sexual Activity    Alcohol use: No    Drug use: No    Sexual activity: Never   Lifestyle    Physical activity     Days per week: Not on file     Minutes per session: Not on file    Stress: Not on file   Relationships    Social connections     Talks on phone: Not on file     Gets together: Not on file     Attends Latter-day service: Not on file     Active member of club or organization: Not on file     Attends meetings of clubs or organizations: Not on file     Relationship status: Not on  file   Other Topics Concern    Not on file   Social History Narrative    Not on file        Review of Systems  14-point review of systems as follows:   No check ynes indicates NEGATIVE response   Constitutional: [] weight loss, [] change to appetite   Eyes: [] change in vision, [] double vision   Ears, nose, mouth, throat: [] frequent nose bleeds, [] ringing in the ears   Respiratory: [] cough, [] wheezing   Cardiovascular: [] chest pain, [] palpitations   Gastrointestinal: [] jaundice, [] nausea/vomiting   Genitourinary: [] incontinence, [] burning with urination   Hematologic/lymphatic: [] easy bruising/bleeding, [] night sweats   Neurological: [] numbness, [] weakness   Endocrine: [] fatigue, [] heat/cold intolerance   Allergy/Immunologic: [] fevers, [] chills   Musculoskeletal: [] muscle pain, [] joint pain   Psychiatric: [] thoughts of harming self/others, [] depression   Integumentary: [] rashes, [] sores that do not heal     Objective:        Vitals:    09/03/20 1309   BP: 115/75   Pulse: 96     Body mass index is 27.15 kg/m².    10/30/19   Constitutional: appears in no acute distress, well-developed, well-nourished     Eyes: normal conjunctiva, PERRLA    Ears, nose, mouth, throat: external appearance of ears and nose normal, hearing intact   Tongue scalloped      Psychiatric: normal judgment and insight. Oriented to person, place, and time.     Data Review:     I have personally reviewed the referring provider's notes, labs, & imaging made available to me today.      RADIOLOGY STUDIES:  I have personally reviewed the pertinent images performed.       Results for orders placed or performed during the hospital encounter of 10/21/19   CT Head Without Contrast    Narrative    EXAMINATION:  CT HEAD WITHOUT CONTRAST    CLINICAL HISTORY:  Headache, acute, norm neuro exam;    TECHNIQUE:  Axial images of the head were obtained without IV contrast administration.  Coronal and sagittal reconstructions were provided.   Three dimensional and MIP images were obtained and evaluated.  Total DLP was 820.64 mGy-cm. Dose lowering technique and automated exposure control were utilized for this exam.    COMPARISON:  CT of the head 01/02/2013.    FINDINGS:  There is normal brain formation.  There is normal gray-white matter differentiation.  There is no hemorrhage, hydrocephalus, or midline shift.  There is no cytotoxic or vasogenic edema.  There is no intra or extra-axial fluid collection.  There is no herniation.    The calvarium is intact.  There is no fracture.  The bilateral orbits are normal.  The paranasal sinuses and mastoid air cells are normally developed and well aerated.      Impression    No acute intracranial abnormality.      Electronically signed by: Albino Win MD  Date:    10/21/2019  Time:    14:41       Lab Results   Component Value Date     10/30/2019    K 4.2 10/30/2019     10/30/2019    CO2 28 10/30/2019    BUN 9 10/30/2019    CREATININE 0.8 10/30/2019    GLU 91 10/30/2019    AST 15 10/30/2019    AST 15 04/12/2016    ALT 11 10/30/2019    ALBUMIN 4.6 10/30/2019    PROT 7.9 10/30/2019    BILITOT 0.5 10/30/2019    CHOL 147 04/22/2016    HDL 44 04/22/2016    LDLCALC 91.0 04/22/2016    TRIG 60 04/22/2016       Lab Results   Component Value Date    WBC 8.49 10/30/2019    HGB 14.1 10/30/2019    HCT 41.6 10/30/2019    MCV 90 10/30/2019     10/30/2019       Lab Results   Component Value Date    TSH 0.350 (L) 06/28/2017           Assessment & Plan:       Problem List Items Addressed This Visit        Neuro    Migraine without aura and with status migrainosus, not intractable    Overview     Lifelong history of menstrual migraines starting with menarche, progressing over the years to episodic migraine, now at borderline high-frequency episodic/chronic frequency with frequent very long episodes of status migrainosus.  Strong family history of migraine.  Neurological exam is very reassuring as CT head.  Optic  discs are normal.  Recent normal eye exam.              ENT    TMJ capsulitis - Primary    Overview     Right, after dental procedure  Injected with lidocaine/steroid 11/21/19  Improving with jaw PT at start PT                   Please call our clinic at [phone] or send a message to Dr. Aguilar on the [email or web address] portal if there are any changes to the plan described below, for example,if you are not contacted for the requested tests, referral(s) within one week, if you are unable to receive the medications prescribed, or if you feel you need to change the treatment course for any reason.     TESTING:  -- none     REFERRALS:  -- none    PREVENTION (use daily regardless of headache):  -- consider migraine vitamins    AS-NEEDED TREATMENT (use total no more than 10 days per month unless otherwise stated):  -- continue Maxalt (rizatriptan) at onset of migraine. May repeat every 2 hours. No more than 3 doses per day, 10 days per month  -- toradol and fioricet for rescue if maxalt did not work   -- limit fioricet to no more than 5-10 tablets per month (for emergencies)    Follow up as needed with Yolanda Schumacher in the fall (nerve block etc)  Call in Dec or Jan to establish care with Dr. Miri Sage     No follow-ups on file.    Rere Aguilar MD

## 2021-02-12 RX ORDER — KETOROLAC TROMETHAMINE 10 MG/1
TABLET, FILM COATED ORAL
Qty: 20 TABLET | Refills: 3 | OUTPATIENT
Start: 2021-02-12

## 2021-05-10 ENCOUNTER — PATIENT MESSAGE (OUTPATIENT)
Dept: RESEARCH | Facility: HOSPITAL | Age: 31
End: 2021-05-10

## 2022-03-09 ENCOUNTER — TELEPHONE (OUTPATIENT)
Dept: NEUROLOGY | Facility: CLINIC | Age: 32
End: 2022-03-09
Payer: COMMERCIAL

## 2022-03-09 NOTE — TELEPHONE ENCOUNTER
Called patient to schedule appointment however upon looking at the schedule screen, patient has appointment scheduled already for 3/21/22 to see DR Sage. Left message stating to call if she wants to reschedule this.

## 2022-06-16 ENCOUNTER — TELEPHONE (OUTPATIENT)
Dept: NEUROLOGY | Facility: CLINIC | Age: 32
End: 2022-06-16
Payer: COMMERCIAL

## 2022-07-01 ENCOUNTER — PATIENT MESSAGE (OUTPATIENT)
Dept: PHARMACY | Facility: CLINIC | Age: 32
End: 2022-07-01
Payer: COMMERCIAL

## 2022-07-01 ENCOUNTER — OFFICE VISIT (OUTPATIENT)
Dept: NEUROLOGY | Facility: CLINIC | Age: 32
End: 2022-07-01
Payer: COMMERCIAL

## 2022-07-01 VITALS
RESPIRATION RATE: 17 BRPM | HEART RATE: 85 BPM | SYSTOLIC BLOOD PRESSURE: 106 MMHG | DIASTOLIC BLOOD PRESSURE: 74 MMHG | HEIGHT: 65 IN | WEIGHT: 179.25 LBS | BODY MASS INDEX: 29.87 KG/M2

## 2022-07-01 DIAGNOSIS — M79.18 CERVICAL MYOFASCIAL PAIN SYNDROME: ICD-10-CM

## 2022-07-01 DIAGNOSIS — G43.719 INTRACTABLE CHRONIC MIGRAINE WITHOUT AURA AND WITHOUT STATUS MIGRAINOSUS: Primary | ICD-10-CM

## 2022-07-01 DIAGNOSIS — G43.001 MIGRAINE WITHOUT AURA AND WITH STATUS MIGRAINOSUS, NOT INTRACTABLE: ICD-10-CM

## 2022-07-01 DIAGNOSIS — G44.40 MEDICATION OVERUSE HEADACHE: ICD-10-CM

## 2022-07-01 DIAGNOSIS — M26.69 TMJ CAPSULITIS: ICD-10-CM

## 2022-07-01 DIAGNOSIS — E28.2 PCOS (POLYCYSTIC OVARIAN SYNDROME): ICD-10-CM

## 2022-07-01 PROCEDURE — 3074F PR MOST RECENT SYSTOLIC BLOOD PRESSURE < 130 MM HG: ICD-10-PCS | Mod: CPTII,S$GLB,, | Performed by: PSYCHIATRY & NEUROLOGY

## 2022-07-01 PROCEDURE — 3078F PR MOST RECENT DIASTOLIC BLOOD PRESSURE < 80 MM HG: ICD-10-PCS | Mod: CPTII,S$GLB,, | Performed by: PSYCHIATRY & NEUROLOGY

## 2022-07-01 PROCEDURE — 3074F SYST BP LT 130 MM HG: CPT | Mod: CPTII,S$GLB,, | Performed by: PSYCHIATRY & NEUROLOGY

## 2022-07-01 PROCEDURE — 1159F PR MEDICATION LIST DOCUMENTED IN MEDICAL RECORD: ICD-10-PCS | Mod: CPTII,S$GLB,, | Performed by: PSYCHIATRY & NEUROLOGY

## 2022-07-01 PROCEDURE — 99215 PR OFFICE/OUTPT VISIT, EST, LEVL V, 40-54 MIN: ICD-10-PCS | Mod: S$GLB,,, | Performed by: PSYCHIATRY & NEUROLOGY

## 2022-07-01 PROCEDURE — 3008F BODY MASS INDEX DOCD: CPT | Mod: CPTII,S$GLB,, | Performed by: PSYCHIATRY & NEUROLOGY

## 2022-07-01 PROCEDURE — 99999 PR PBB SHADOW E&M-EST. PATIENT-LVL IV: ICD-10-PCS | Mod: PBBFAC,,, | Performed by: PSYCHIATRY & NEUROLOGY

## 2022-07-01 PROCEDURE — 3078F DIAST BP <80 MM HG: CPT | Mod: CPTII,S$GLB,, | Performed by: PSYCHIATRY & NEUROLOGY

## 2022-07-01 PROCEDURE — 1159F MED LIST DOCD IN RCRD: CPT | Mod: CPTII,S$GLB,, | Performed by: PSYCHIATRY & NEUROLOGY

## 2022-07-01 PROCEDURE — 1160F PR REVIEW ALL MEDS BY PRESCRIBER/CLIN PHARMACIST DOCUMENTED: ICD-10-PCS | Mod: CPTII,S$GLB,, | Performed by: PSYCHIATRY & NEUROLOGY

## 2022-07-01 PROCEDURE — 1160F RVW MEDS BY RX/DR IN RCRD: CPT | Mod: CPTII,S$GLB,, | Performed by: PSYCHIATRY & NEUROLOGY

## 2022-07-01 PROCEDURE — 99215 OFFICE O/P EST HI 40 MIN: CPT | Mod: S$GLB,,, | Performed by: PSYCHIATRY & NEUROLOGY

## 2022-07-01 PROCEDURE — 99999 PR PBB SHADOW E&M-EST. PATIENT-LVL IV: CPT | Mod: PBBFAC,,, | Performed by: PSYCHIATRY & NEUROLOGY

## 2022-07-01 PROCEDURE — 3008F PR BODY MASS INDEX (BMI) DOCUMENTED: ICD-10-PCS | Mod: CPTII,S$GLB,, | Performed by: PSYCHIATRY & NEUROLOGY

## 2022-07-01 RX ORDER — RIZATRIPTAN BENZOATE 10 MG/1
TABLET ORAL
Qty: 12 TABLET | Refills: 11 | Status: SHIPPED | OUTPATIENT
Start: 2022-07-01 | End: 2022-12-29 | Stop reason: SDUPTHER

## 2022-07-01 RX ORDER — CELECOXIB 120 MG/4.8ML
120 LIQUID ORAL DAILY PRN
Qty: 28.8 ML | Refills: 5 | Status: SHIPPED | OUTPATIENT
Start: 2022-07-01

## 2022-07-01 NOTE — PROGRESS NOTES
"[facility]    Chief complaint: migraine     History of Present Illness:    32Y RHF with hypothyroidism, MVP, MTHFR single mutation, ODALYS, PCOS who presents for initial evaluation of migraine. She is here alone and able to provide.     Headache history  Age at onset and course over time: since 14 years old had migraine during periods mainly. Over the years the headache had increased and more increased days. She had in 2019 a procedure for wisdom teeth extraction (per Dr. Aguilar note in 2019 the surgery was 45 minutes long), quickly after that she began having significant TMJ pain and having capsulitis diagnosed. She had ED visit after that for 6 week of persistent headache, given toradol, zofran, fiorinal , reglan that caused akathisia. CT head negative. Her Bps were normal initially. She mentions that she has pain in her neck/shoulder, pain radiates into her head, which she feels it's secondary to jaw pain. She mentions that her jaw still hurting.   She has been intermittently on PT for TMJ at [facility] which helps significantly, she has ultrasound machine on shoulder, upper jaw, stretches in neck area and dry needling mostly in the cervical musculature and into the trapezius.     Location:Rt side under eye and head , into the Rt neck and into the right shoulder   Pain is daily, but not constant.   She works at a desk and being in the same posture makes it worse.   When not at desk is better  She might already mild pain/dull ache, have some headache in the morning, as she gets her breakfast might improve some, but as she is sitting at desk by 4-5 pm she is feeling the migraine coming on. She will have more intensified pain int he temple under the eye, while that happens feels the jaw pain and progresses. The pain is mild to moderate due to taking it severe. She mentions that the light/sound sensitivity is when she gets the really severe migraine where she is "knocked out" toradol, " fioricet and also triptan. The severe ones can sometimes be associated to menstrual cycle.   Character: throbbing/pounding   Intensity:  Mild to severe   Frequency: 30/30 days.   Duration: >4 hours  Aura: very infrequent, scotoma has had it not even 1 year ago.   Associated symptoms: photophobia, phonophobia, kinesiophobia, neck tightness/pain, nausea/vomiting  Other neurologic symptoms: nasal congestion, neck tightness/pain,  Precipitating factors: sleep deprivation,  missed meal, exercise, foods, alcohol, weather changes, stress  Alleviating factors: sleep, darkness, heat, ice, massage, local pressure, medications  Aggravating factors: exposure to light, sound, smells, coughing/sneezing/bending over, position, stress  Family history of headache: mother and maternal aunt has migraine   ER/UC visits: none   Caffeine:  1 cup/coffee per day   Sleep: good, no problems with sleep , feels refreshed, pregnancy prevention with tracking ovulation.      Medication history:  Current acute treatment:  Maxalt 5-6 days per week -> within 20 - 30 minutes - headache free 90% of the time   Fioricet - <6 times a month  , does not work very well, never resolves   [id number]  - a few times a month, unsure if works or not   Sprix - unclear if it helped or not     Previously tried/failed acute treatment:  Excedrin migraine    Current preventive:  None      Previously tried/failed preventative treatment:  topiramate - caused eye pain, blurred vision   Duloxetine - blurry vision   Tizanidine 4mg  - not much help   Cefaly - ineffective    Unable to do antihypertensives due to her low normal Bps.     MIDAS: 25    Neuroimaging and other studies:   Results for orders placed or performed during the hospital encounter of 10/21/19   CT Head Without Contrast    Narrative    EXAMINATION:  CT HEAD WITHOUT CONTRAST    CLINICAL HISTORY:  Headache, acute, norm neuro exam;    TECHNIQUE:  Axial images of the head were obtained without IV contrast  administration.  Coronal and sagittal reconstructions were provided.  Three dimensional and MIP images were obtained and evaluated.  Total DLP was 820.64 mGy-cm. Dose lowering technique and automated exposure control were utilized for this exam.    COMPARISON:  CT of the head 01/02/2013.    FINDINGS:  There is normal brain formation.  There is normal gray-white matter differentiation.  There is no hemorrhage, hydrocephalus, or midline shift.  There is no cytotoxic or vasogenic edema.  There is no intra or extra-axial fluid collection.  There is no herniation.    The calvarium is intact.  There is no fracture.  The bilateral orbits are normal.  The paranasal sinuses and mastoid air cells are normally developed and well aerated.      Impression    No acute intracranial abnormality.      Electronically signed by: Albino Win MD  Date:    10/21/2019  Time:    14:41     ROS: The fourteen point review of system was performed.   Constitutional:  Denies fevers/cold or heat intolerance, weight loss/gain or fatigue.  Eyes:                        Denies diplopia, ptosis, visual field defects or ocular disease   excepting any listed above.  ENT:   Denies hearing loss, infection, carcinoma or other diseases excepting any listed above.   Cardiovascular:  Denies stroke, CAD, arrhythmia, CHF or other disease excepting any listed above.  Pulmonary:  Denies dyspnea, COPD, RAD or infection or neoplasm excepting any listed above.  Gastrointestinal: Denies ulcer disease, liver or other disease excepting any listed above.  Skin:   Denies rash, skin cancer, or other cutaneous disorder excepting any listed above.  Neurological:  See HPI  Musculoskeletal: Denies RA, fractures or other joint or skeletal problems excepting any listed above.  Heme/Lymphatic: Denies any blood or lymph system neoplasm or disorder excepting any listed above.  Endocrine:  Denies any thyroid or other disorders, excepting any listed above.  Allergic/Immuno: Denies  any autoimmune disease or allergy excepting any listed above.  Psychiatric:  Denies any disorder or treatment excepting any listed above.  Urologic:  Denies any difficulties with the urinary system or sexual function except noted above.    Past Medical History:   Diagnosis Date    Atypical Chest Pain 04/2016     Dr. Jeffy Cartagena Ruled Out GB DX With 2016 U/S    Chronic Fatigue     4/22/16 B12 = Normal; 4/19/16 Extensive Labs Reviewed    Chronic Mild Shortness Of Breath     4/19/16 Ordered Echocardiogram    Family H/O [place]'s Disease     Her Mother Has This; 04/2016 Random Cortisol And Cortrosyn Stimulation Test Were Normal    Generalized Arthralgias X 2 Weeks 4/19/16 FBM?    4/22/16 RF, MICHAEL, CPK, ESR = Normal     Generalized Paresthesias     Dr. Guille Cabrera Ruled MS Out 03/2016    Hypothyroidism     On Widen Thyroid; Her Mother Has Hypothyroidism Too    Migraines     Dr. Guille Cabrera; Her Mother Also Has Migraines    Mitral Valve Prolapse Ruled Out     4/22/16 Echocardiogram = Normal But With Thickened MV With Trivial MR    MTHFR Single Mutation     Her Natriopath Tested This 3/7/16    Polycystic Ovarian Syndrome     Dr. Renea Medrano    Situational Anxiety      Past Surgical History:   Procedure Laterality Date    MITRAL VALVE SURGERY      None       Family History   Problem Relation Age of Onset    Migraines Mother     Thyroid disease Mother     Breast cancer Maternal Aunt 60     Social history:  Occupation: works for a company in front  of a desk   Social History     Tobacco Use    Smoking status: Never Smoker    Smokeless tobacco: Never Used   Substance Use Topics    Alcohol use: No    Drug use: No     Review of patient's allergies indicates:  No Known Allergies      Current Outpatient Medications:     ARMOUR THYROID 60 mg Tab, Take 1 tablet (60 mg total) by mouth once daily., Disp: 30 tablet, Rfl: 11    ketorolac (TORADOL) 10 mg tablet, TAKE 2 TABLETS BY MOUTH TWICE DAILY AS  NEEDED FOR HEADACHE **NO MORE THAN 2 DAYS PER WEEK**, Disp: 20 tablet, Rfl: 3    LORazepam (ATIVAN) 1 MG tablet, TAKE ONE TABLET BY MOUTH TWO TIMES A DAY AS NEEDED FOR ANXIETY, Disp: 60 tablet, Rfl: 4    ondansetron (ZOFRAN-ODT) 8 MG TbDL, DISSOLVE 1 TABLET IN MOUTH EVERY 6 HOURS AS NEEDED, Disp: 25 tablet, Rfl: 5    rizatriptan (MAXALT) 10 MG tablet, TAKE 1 TABLET BY MOUTH AT ONSET OF HEADACHE, MAY TAKE 2ND TABLET TWO HOURS LATER. NO MORE THAN 2 IN 24 HOURS, Disp: 9 tablet, Rfl: 11    PHYSICAL EXAMINATION  Vitals:    07/01/22 0800   BP: 106/74   Pulse: 85   Resp: 17   General: The patient is a well-developed, well-nourished looking of stated age in no acute distress.  Head: Normocephalic, atraumatic  Eyes, ears, nose and throat: normal.  Neck: Supple  Cardiovascular: No carotid bruits.  Regular rate and rhythm.   TTP over right occipitalis, masseters, cervical paraspinals and right trapezius   NEUROLOGIC EXAM:  MENTAL: The patient is awake, alert and oriented times to time, place, location and situation. Intact recent memory, attention, concentration. Language and speech are normal. No aphasia, no dysarthria  CRANIAL NERVES:   CN II: visual fields are intact to confrontation with no defects;  funduscopic examination is within normal limits without evidence of papilledema and signs of venous pulsations.  Pupils are equal, round and reactive to light and accommodation.    III, IV and VI: extraocular movements are intact to all directions of gaze with normal convergence.  V: facial sensation is intact to light touch in divisions V1 through V3.    VII: Facial muscle strength is intact to eyebrow raising, forceful eyelid closure, and cheek puffing.    VIII: Hearing acuity is intact to finger rub.  IX, X: palate rises symmetrically and uvula midline.    XI: Sternocleidomastoid and trapezius strength are 5/5 bilaterally.    XII: Tongue protrudes midline without atrophy or fasciculations.   MOTOR EXAM: . No pronator  drift,  shows normal strength ( 5/5 strength )in all 4 extremities.    SENSORY EXAM: intact  light touch, vibration, and position bilaterally.  CEREBELLAR EXAM: shows normal finger-to-nose and heel-to-shin.   DEEP TENDON REFLEXES:  +2 in brachioradialis, biceps, triceps, knee jerks, ankle jerks, downgoing toes b/l. No abnormal reflexes are present.  GAIT/STANCE: Romberg Negative.  Standard gait was normal with normal stride, arm swing and turning.  Normal heel and toe walking and tandem gait.       Impression:  Chronic migraine without aura - she has had migraine since teen which were mainly menstrual migrane, but increased over time,a fter a 45 minute wisdom tooth extraction began having headache x 6 weeks and TMJ pain. She was diagnosed with TMJ capsulitis, a few preventives were tried without much improvement.  She has found success to Maxalt which typically 90% resolves migraine within 30 minutes, but over time her headache is increasing she has them about 5 days/week and using the rizatriptan about 5 days/week now. She has had l imited success with prevention. She is not interested in CGRP Mab at this moment. She has not found fioricet or ketorolac to be as helpful. She did find PT to be helpful. Neurological examination did not show any abnormalities. She has ttp over Rt occipital/cervical myofascial and trapezius pain.     Medication overuse headache - from maxalt     Cervical myofascial pain - agree with PT , looking at ergonomic set up for her desk     TMJ capsulitis/bruxism - will refer to [facility] for further evaluation     Comorbidities:  Hypothyroidism     MVP   MTHFR single mutation   ODALYS    PCOS    Plan:   1- For preventive management: The patient has chronic migraines (G43.719). Patient suffers from headaches more than 15 days a month lasting more than 4 hours a day with no relief of symptoms despite trying multiple medications including but not limited to anti- epileptics,  antihypertensives,  and  antidepressants. Botox treatment was approved for chronic migraines in October 2010. The patient will be an ideal candidate for Botox. We are planning for 3 treatments 3 months apart and aiming for atleast 50% improvement in the symptoms. If we see no improvement after 3 treatments, we will discontinue the injections.    Muscles to be injected:  total of 155 units of botulinum toxin type A were  injected in the following muscles: Procerus 5 units,  5 units bilaterally, frontalis 20 units, temporalis 20 units bilaterally, occipitalis 15 units, upper cervical paraspinals 10 units bilaterally and trapezius 15 units bilaterally. Total of 155 units in 31 sites. Unavoidable waste of 45 units will be discarded.     2- Acute management: Maxalt 10 mg at onset of attack, can repeat after 2 hours. Max 3/day. We will work on not using over 10 days a month  For moderate to severe: elyxyb 120mg , max 1/day. Do not take with other NSAIDs     For nausea: zofran 4mg every 6-8 hours     Nerivio device - will send prescription in     3- [facility] referral for Dr. Josh Saha     RTC in 3 weeks for Botox.         Miri Snider MD   Board Certified Neurologist  [facility] Certified Headache Medicine     I spent 68 minutes on day of this encounter preparing, treating, and managing this patient with chronic migraine, medication overuse HA, cervical myofascial pain, tmj , hypothyroidism, MVP, MTHFR single mutation, ODALYS, PCOS

## 2022-11-28 DIAGNOSIS — G43.001 MIGRAINE WITHOUT AURA AND WITH STATUS MIGRAINOSUS, NOT INTRACTABLE: ICD-10-CM

## 2022-11-28 RX ORDER — RIZATRIPTAN BENZOATE 10 MG/1
TABLET ORAL
Qty: 12 TABLET | Refills: 11 | Status: CANCELLED | OUTPATIENT
Start: 2022-11-28

## 2022-11-29 ENCOUNTER — PATIENT MESSAGE (OUTPATIENT)
Dept: NEUROLOGY | Facility: CLINIC | Age: 32
End: 2022-11-29
Payer: COMMERCIAL

## 2022-12-29 ENCOUNTER — PATIENT MESSAGE (OUTPATIENT)
Dept: NEUROLOGY | Facility: CLINIC | Age: 32
End: 2022-12-29
Payer: COMMERCIAL

## 2022-12-29 DIAGNOSIS — G43.001 MIGRAINE WITHOUT AURA AND WITH STATUS MIGRAINOSUS, NOT INTRACTABLE: ICD-10-CM

## 2022-12-29 RX ORDER — RIZATRIPTAN BENZOATE 10 MG/1
TABLET ORAL
Qty: 12 TABLET | Refills: 0 | Status: SHIPPED | OUTPATIENT
Start: 2022-12-29 | End: 2023-01-25 | Stop reason: ALTCHOICE

## 2022-12-30 DIAGNOSIS — G43.001 MIGRAINE WITHOUT AURA AND WITH STATUS MIGRAINOSUS, NOT INTRACTABLE: ICD-10-CM

## 2022-12-30 RX ORDER — RIZATRIPTAN BENZOATE 10 MG/1
TABLET ORAL
Qty: 12 TABLET | Refills: 0 | Status: CANCELLED | OUTPATIENT
Start: 2022-12-30

## 2023-01-25 ENCOUNTER — OFFICE VISIT (OUTPATIENT)
Dept: NEUROLOGY | Facility: CLINIC | Age: 33
End: 2023-01-25
Payer: COMMERCIAL

## 2023-01-25 VITALS
DIASTOLIC BLOOD PRESSURE: 79 MMHG | HEIGHT: 65 IN | SYSTOLIC BLOOD PRESSURE: 121 MMHG | HEART RATE: 94 BPM | BODY MASS INDEX: 29.53 KG/M2 | WEIGHT: 177.25 LBS | RESPIRATION RATE: 17 BRPM

## 2023-01-25 DIAGNOSIS — M26.69 TMJ CAPSULITIS: ICD-10-CM

## 2023-01-25 DIAGNOSIS — M79.18 CERVICAL MYOFASCIAL PAIN SYNDROME: ICD-10-CM

## 2023-01-25 DIAGNOSIS — G43.719 INTRACTABLE CHRONIC MIGRAINE WITHOUT AURA AND WITHOUT STATUS MIGRAINOSUS: Primary | ICD-10-CM

## 2023-01-25 DIAGNOSIS — G44.40 MEDICATION OVERUSE HEADACHE: ICD-10-CM

## 2023-01-25 PROCEDURE — 1159F PR MEDICATION LIST DOCUMENTED IN MEDICAL RECORD: ICD-10-PCS | Mod: CPTII,S$GLB,, | Performed by: NURSE PRACTITIONER

## 2023-01-25 PROCEDURE — 1160F RVW MEDS BY RX/DR IN RCRD: CPT | Mod: CPTII,S$GLB,, | Performed by: NURSE PRACTITIONER

## 2023-01-25 PROCEDURE — 3078F PR MOST RECENT DIASTOLIC BLOOD PRESSURE < 80 MM HG: ICD-10-PCS | Mod: CPTII,S$GLB,, | Performed by: NURSE PRACTITIONER

## 2023-01-25 PROCEDURE — 3008F BODY MASS INDEX DOCD: CPT | Mod: CPTII,S$GLB,, | Performed by: NURSE PRACTITIONER

## 2023-01-25 PROCEDURE — 99999 PR PBB SHADOW E&M-EST. PATIENT-LVL IV: ICD-10-PCS | Mod: PBBFAC,,, | Performed by: NURSE PRACTITIONER

## 2023-01-25 PROCEDURE — 3074F PR MOST RECENT SYSTOLIC BLOOD PRESSURE < 130 MM HG: ICD-10-PCS | Mod: CPTII,S$GLB,, | Performed by: NURSE PRACTITIONER

## 2023-01-25 PROCEDURE — 1159F MED LIST DOCD IN RCRD: CPT | Mod: CPTII,S$GLB,, | Performed by: NURSE PRACTITIONER

## 2023-01-25 PROCEDURE — 3008F PR BODY MASS INDEX (BMI) DOCUMENTED: ICD-10-PCS | Mod: CPTII,S$GLB,, | Performed by: NURSE PRACTITIONER

## 2023-01-25 PROCEDURE — 3078F DIAST BP <80 MM HG: CPT | Mod: CPTII,S$GLB,, | Performed by: NURSE PRACTITIONER

## 2023-01-25 PROCEDURE — 3074F SYST BP LT 130 MM HG: CPT | Mod: CPTII,S$GLB,, | Performed by: NURSE PRACTITIONER

## 2023-01-25 PROCEDURE — 99999 PR PBB SHADOW E&M-EST. PATIENT-LVL IV: CPT | Mod: PBBFAC,,, | Performed by: NURSE PRACTITIONER

## 2023-01-25 PROCEDURE — 99214 PR OFFICE/OUTPT VISIT, EST, LEVL IV, 30-39 MIN: ICD-10-PCS | Mod: S$GLB,,, | Performed by: NURSE PRACTITIONER

## 2023-01-25 PROCEDURE — 1160F PR REVIEW ALL MEDS BY PRESCRIBER/CLIN PHARMACIST DOCUMENTED: ICD-10-PCS | Mod: CPTII,S$GLB,, | Performed by: NURSE PRACTITIONER

## 2023-01-25 PROCEDURE — 99214 OFFICE O/P EST MOD 30 MIN: CPT | Mod: S$GLB,,, | Performed by: NURSE PRACTITIONER

## 2023-01-25 RX ORDER — NARATRIPTAN 2.5 MG/1
TABLET ORAL
Qty: 10 TABLET | Refills: 11 | Status: SHIPPED | OUTPATIENT
Start: 2023-01-25 | End: 2023-10-12 | Stop reason: SDUPTHER

## 2023-01-25 NOTE — PATIENT INSTRUCTIONS
Please call our clinic at [phone] or send a message on the [employer] portal if there are any changes to the plan described below, for example,if you are not contacted for the requested tests, referral(s) within one week, if you are unable to receive the medications prescribed, or if you feel you need to change the treatment course for any reason.     TESTING:  -- none    REFERRALS:    PREVENTION (use daily regardless of headache):  -- start magnesium in ONE of the following preparations -               1. Magnesium oxide 800mg daily (the most common over the counter kind, may causes loose stools)              2. Magnesium citrate 400-500mg daily (harder to find, but more neutral on the bowels)              3. Magnesium glycinate 400mg daily (hardest to find, look online, but most bowel-neutral, best absorbed)     AS-NEEDED TREATMENT (use total no more than 10 days per month unless otherwise stated):  -- STOP rizatriptan  -- start naratriptan with next migraine. You can repeat two hours later if needed. Next step would be Nurtec

## 2023-01-25 NOTE — PROGRESS NOTES
"Date of service: 1/25/2023  Referring provider: No ref. provider found    Subjective:      Chief complaint: Headache       Patient ID: Priscila Vazquez is a 33 y.o. female with anxiety, bruxism, chronic fatigue, arthralgias, hypothyroidism, mitral valve prolapse, migraine who presents for new patient evaluation of headache     She was previously evaluated by Dr. Sage 7/1/22. Prior to that she was followed by Dr. Aguilar. She is transferring care to me.    History of Present Illness    ORIGINAL HEADACHE HISTORY - 1/25/23  Age at onset and course over time: since age 14  Migraines began to worsen after a jaw surgery. Every time she does PT, she has significant improvement while doing PT.    Botox was previously recommended but she reports she "keeps chickening out". She is unsure if she wants to start Botox.     Aura - bright flashing lights, lasts 15 minutes, always followed by migraine     Family history of migraines - mom, maternal aunt  Location: right side  Quality:  [] pressure [] tight [x] throbbing [] sharp [] stabbing   Severity: current 1 with range 0-7  Duration: hours   Frequency: 4-5 days per week  Headaches awaken at night?:    Worst time of day: varies   Associated with: [x] photophobia [x]  phonophobia [] osmophobia [] blurred vision  [] double vision [] loss of appetite [x] nausea [x] vomiting [] dizziness [] vertigo  [] tinnitus [] irritability [] sinus pressure [] problems with concentration   [x] neck tightness   Alleviated by:  [x] sleep [x] darkness [x] massage [x] heat [] ice [x] medication  Exacerbated by:  [x] fatigue [x] light [x] noise [x] smells [x] coughing [] sneezing  [] bending over [] ovulation [] menses [] alcohol [] change in weather []  stress  Ipsilateral autonomic: [] nasal congestion [] lacrimation [] ptosis [] injection [] edema [] foreign body sensation [] ear fullness   ICP:  [] transient visual obscurations  [] tinnitus   [] positional headache  [x] non-positional   Sleep " habits:  Caffeine intake:  Gyn status (if female): not actively trying to conceive     Current acute treatment:  Ativan  Zofran  Rizatriptan - 4-5 times per week  Naproxen  Toradol - for severe migraines, once per month  Celebrex  Fioricet - once per month or less    Current prevention:  None    Previously tried/failed acute treatment:  Tylenol    Previously tried/failed preventative treatment:  topiramate - caused eye pain, blurred vision   Duloxetine - tried 10 days, blurred vision but did help headaches   Desvenlafaxine 50mg     Review of patient's allergies indicates:  No Known Allergies  Current Outpatient Medications   Medication Sig Dispense Refill    ARMOUR THYROID 60 mg Tab Take 1 tablet (60 mg total) by mouth once daily. 30 tablet 11    butalbital-acetaminophen-caffeine -40 mg (FIORICET, ESGIC) -40 mg per tablet Take 1 tablet by mouth every 6 (six) hours as needed.      celecoxib (ELYXYB) 120 mg/4.8 mL (25 mg/mL) Soln Take 120 mg by mouth daily as needed (migraine. max 1/day.). 28.8 mL 5    ketorolac (TORADOL) 10 mg tablet TAKE 2 TABLETS BY MOUTH TWICE DAILY AS NEEDED FOR HEADACHE **NO MORE THAN 2 DAYS PER WEEK** 20 tablet 3    LORazepam (ATIVAN) 1 MG tablet TAKE ONE TABLET BY MOUTH TWO TIMES A DAY AS NEEDED FOR ANXIETY 30 tablet 0    naproxen sodium (ANAPROX) 550 MG tablet Take 1 tablet (550 mg total) by mouth 2 (two) times daily with meals. 20 tablet 0    ondansetron (ZOFRAN-ODT) 8 MG TbDL DISSOLVE 1 TABLET IN MOUTH EVERY 6 HOURS AS NEEDED 25 tablet 5    naratriptan (AMERGE) 2.5 MG tablet 2.5 mg at onset of headache, may repeat in 4 hours if needed. No more than 10 days per month. 10 tablet 11     No current facility-administered medications for this visit.       Past Medical History  Past Medical History:   Diagnosis Date    Atypical Chest Pain 04/2016     Dr. Jeffy Cartagena Ruled Out GB DX With 2016 U/S    Chronic Fatigue     4/22/16 B12 = Normal; 4/19/16 Extensive Labs Reviewed    Chronic  Mild Shortness Of Breath     4/19/16 Ordered Echocardiogram    Family H/O Gabino's Disease     Her Mother Has This; 04/2016 Random Cortisol And Cortrosyn Stimulation Test Were Normal    Generalized Arthralgias X 2 Weeks 4/19/16 FBM?    4/22/16 RF, MICHAEL, CPK, ESR = Normal     Generalized Paresthesias     Dr. Guille Cabrera Ruled MS Out 03/2016    Hypothyroidism     On [place] Thyroid; Her Mother Has Hypothyroidism Too    Migraines     Dr. Guille Cabrera; Her Mother Also Has Migraines    Mitral Valve Prolapse Ruled Out     4/22/16 Echocardiogram = Normal But With Thickened MV With Trivial MR    MTHFR Single Mutation     Her Natriopath Tested This 3/7/16    Polycystic Ovarian Syndrome     Dr. Renea Medrano    Situational Anxiety        Past Surgical History  Past Surgical History:   Procedure Laterality Date    MITRAL VALVE SURGERY      patient denies surgery on mitral valve    None         Family History  Family History   Problem Relation Age of Onset    Migraines Mother     Thyroid disease Mother     Breast cancer Maternal Aunt 60       Social History  Social History     Socioeconomic History    Marital status:    Tobacco Use    Smoking status: Never    Smokeless tobacco: Never   Substance and Sexual Activity    Alcohol use: Yes     Comment: social     Drug use: No    Sexual activity: Never        Review of Systems  14-point review of systems as follows:   No check ynes indicates NEGATIVE response   Constitutional: [] weight loss, [] change to appetite   Eyes: [] change in vision, [] double vision   Ears, nose, mouth, throat: [] frequent nose bleeds, [] ringing in the ears   Respiratory: [] cough, [] wheezing   Cardiovascular: [] chest pain, [] palpitations   Gastrointestinal: [] jaundice, [] nausea/vomiting   Genitourinary: [] incontinence, [] burning with urination   Hematologic/lymphatic: [] easy bruising/bleeding, [] night sweats   Neurological: [] numbness, [] weakness   Endocrine: [] fatigue, []  heat/cold intolerance   Allergy/Immunologic: [] fevers, [] chills   Musculoskeletal: [] muscle pain, [] joint pain   Psychiatric: [] thoughts of harming self/others, [] depression   Integumentary: [] rashes, [] sores that do not heal     Objective:        Vitals:    01/25/23 1509   BP: 121/79   Pulse: 94   Resp: 17     Body mass index is 29.5 kg/m².    1/25/23  Constitutional: appears in no acute distress, well-developed, well-nourished     Eyes: normal conjunctiva, PERRLA    Ears, nose, mouth, throat: external appearance of ears and nose normal, hearing intact, tongue scalloping      Cardiovascular: regular rate and rhythm, no murmurs appreciated    Respiratory: unlabored respirations, breath sounds normal bilaterally    Gastrointestinal: no visible abdominal masses, no guarding, no visible hernia    Musculoskeletal: normal tone in all four extremities. No abnormal movements. No pronator drift. No orbit. Symmetric finger tapping. Normal station. Normal regular gait.     Spine:   CERVICAL SPINE:  ROM: normal   MUSCLE SPASM: R>L   FACET LOADING: no   SPURLING: no  AMANDA / DOMONIQUE tender: right DOMONIQUE     Psychiatric: normal judgment and insight. Oriented to person, place, and time.     Neurologic:   Cortical functions: recent and remote memory intact, normal attention span and concentration, speech fluent, adequate fund of knowledge   Cranial nerves: visual fields full, PERRLA, EOMI, symmetric facial strength, hearing intact, palate elevates symmetrically, shoulder shrug 5/5, tongue protrudes midline   Reflexes: 2+ in the upper, no Rahman  Sensation: intact to temperature throughout   Coordination: normal finger to nose, heel to shin    Data Review:     I have personally reviewed the referring provider's notes, labs, & imaging made available to me today.      RADIOLOGY STUDIES:  I have personally reviewed the pertinent images performed.       Results for orders placed or performed during the hospital encounter of 10/21/19    CT Head Without Contrast    Narrative    EXAMINATION:  CT HEAD WITHOUT CONTRAST    CLINICAL HISTORY:  Headache, acute, norm neuro exam;    TECHNIQUE:  Axial images of the head were obtained without IV contrast administration.  Coronal and sagittal reconstructions were provided.  Three dimensional and MIP images were obtained and evaluated.  Total DLP was 820.64 mGy-cm. Dose lowering technique and automated exposure control were utilized for this exam.    COMPARISON:  CT of the head 01/02/2013.    FINDINGS:  There is normal brain formation.  There is normal gray-white matter differentiation.  There is no hemorrhage, hydrocephalus, or midline shift.  There is no cytotoxic or vasogenic edema.  There is no intra or extra-axial fluid collection.  There is no herniation.    The calvarium is intact.  There is no fracture.  The bilateral orbits are normal.  The paranasal sinuses and mastoid air cells are normally developed and well aerated.      Impression    No acute intracranial abnormality.      Electronically signed by: Albino Win MD  Date:    10/21/2019  Time:    14:41       Lab Results   Component Value Date     03/12/2022    K 4.2 03/12/2022     03/12/2022    CO2 24 03/12/2022    BUN 10 03/12/2022    CREATININE 0.56 03/12/2022    GLU 91 03/12/2022    AST 23 03/12/2022    AST 15 04/12/2016    ALT 18 03/12/2022    ALBUMIN 4.6 03/12/2022    PROT 7.4 03/12/2022    BILITOT 0.5 03/12/2022    CHOL 148 03/12/2022    HDL 38 (L) 03/12/2022    LDLCALC 97.8 03/12/2022    TRIG 61 03/12/2022       Lab Results   Component Value Date    WBC 7.20 03/12/2022    HGB 13.4 03/12/2022    HCT 39.5 03/12/2022    MCV 88 03/12/2022     03/12/2022       Lab Results   Component Value Date    TSH 0.461 03/12/2022           Assessment & Plan:       Problem List Items Addressed This Visit          Neuro    Intractable chronic migraine without aura and without status migrainosus - Primary    Overview     Migraine headaches  since age 14 with strong family history. Headaches are typically unilateral, moderate to severe in intensity, worsen with activity, pounding in quality and associated with sensitivity to light and sound.   Gradual progression pattern, lack of red flag features on history, and normal neurological exam are reassuring for primary as opposed to secondary etiology of headaches thus imaging will not be pursued for this history and this exam at this time.    She has 16-20 migraine days per month. We discussed prevention is cruz however she is not ready to start either Botox or CGRP. She wishes to continue PT and following with dentist for TMJ issues.    We discussed need to limit any triptan to 10 days per month or less. Will change to naratriptan. Next step would be Nurtec.          Relevant Medications    naratriptan (AMERGE) 2.5 MG tablet       ENT    TMJ capsulitis    Overview     Right, after dental procedure  Injected with lidocaine/steroid 11/21/19  Improving with jaw PT at start PT         Current Assessment & Plan     Recommend follow with PT, she goes to [facility].           Other Visit Diagnoses       Medication overuse headache        Rizatriptan 4-5 days per week. Rare Toradol and Fioricet    Cervical myofascial pain syndrome        TTP. Recommend PT as this has helped in the past                 Please call our clinic at [phone] or send a message on the [employer] portal if there are any changes to the plan described below, for example,if you are not contacted for the requested tests, referral(s) within one week, if you are unable to receive the medications prescribed, or if you feel you need to change the treatment course for any reason.     TESTING:  -- none    REFERRALS:    PREVENTION (use daily regardless of headache):  -- start magnesium in ONE of the following preparations -               1. Magnesium oxide 800mg daily (the most common over the counter kind, may causes loose stools)              2.  Magnesium citrate 400-500mg daily (harder to find, but more neutral on the bowels)              3. Magnesium glycinate 400mg daily (hardest to find, look online, but most bowel-neutral, best absorbed)     AS-NEEDED TREATMENT (use total no more than 10 days per month unless otherwise stated):  -- STOP rizatriptan  -- start naratriptan with next migraine. You can repeat two hours later if needed. Next step would be Nurtec     Follow up in about 6 months (around 7/25/2023) for follow up with [facility].    Yolanda Keller, NP

## 2023-01-25 NOTE — LETTER
January 25, 2023      [facility]  [address]  [place] [address]  Phone: [phone]  Fax: [phone]       Patient: Priscila Vazquez   YOB: 1990  Date of Visit: 01/25/2023    To Whom It May Concern:    Britney Vazquez  was at [facility] on 01/25/2023. It is recommended that she not participate in lifting, strenuous exercise at this time. This extends for an indefinite time frame while undergoing treatment.     Sincerely,    Yolanda Keller NP

## 2023-01-27 ENCOUNTER — PATIENT MESSAGE (OUTPATIENT)
Dept: NEUROLOGY | Facility: CLINIC | Age: 33
End: 2023-01-27
Payer: COMMERCIAL

## 2023-01-27 RX ORDER — BUTALBITAL, ACETAMINOPHEN AND CAFFEINE 50; 325; 40 MG/1; MG/1; MG/1
1 TABLET ORAL EVERY 6 HOURS PRN
Qty: 10 TABLET | Refills: 0 | Status: SHIPPED | OUTPATIENT
Start: 2023-01-27 | End: 2023-03-10 | Stop reason: SDUPTHER

## 2023-01-29 ENCOUNTER — PATIENT MESSAGE (OUTPATIENT)
Dept: NEUROLOGY | Facility: CLINIC | Age: 33
End: 2023-01-29
Payer: COMMERCIAL

## 2023-01-30 ENCOUNTER — PATIENT MESSAGE (OUTPATIENT)
Dept: NEUROLOGY | Facility: CLINIC | Age: 33
End: 2023-01-30
Payer: COMMERCIAL

## 2023-03-08 ENCOUNTER — PATIENT MESSAGE (OUTPATIENT)
Dept: NEUROLOGY | Facility: CLINIC | Age: 33
End: 2023-03-08
Payer: COMMERCIAL

## 2023-03-08 DIAGNOSIS — G43.719 INTRACTABLE CHRONIC MIGRAINE WITHOUT AURA AND WITHOUT STATUS MIGRAINOSUS: Primary | ICD-10-CM

## 2023-03-08 RX ORDER — RIMEGEPANT SULFATE 75 MG/75MG
75 TABLET, ORALLY DISINTEGRATING ORAL DAILY PRN
Qty: 8 TABLET | Refills: 11 | Status: SHIPPED | OUTPATIENT
Start: 2023-03-08 | End: 2023-03-10 | Stop reason: SDUPTHER

## 2023-03-09 ENCOUNTER — PATIENT MESSAGE (OUTPATIENT)
Dept: NEUROLOGY | Facility: CLINIC | Age: 33
End: 2023-03-09
Payer: COMMERCIAL

## 2023-03-09 ENCOUNTER — TELEPHONE (OUTPATIENT)
Dept: NEUROLOGY | Facility: CLINIC | Age: 33
End: 2023-03-09
Payer: COMMERCIAL

## 2023-03-09 DIAGNOSIS — G43.719 INTRACTABLE CHRONIC MIGRAINE WITHOUT AURA AND WITHOUT STATUS MIGRAINOSUS: Primary | ICD-10-CM

## 2023-03-09 NOTE — TELEPHONE ENCOUNTER
Patient was sent message via My Chart as she also sent question there. See previous documentation.

## 2023-03-09 NOTE — TELEPHONE ENCOUNTER
----- Message from James Liang sent at 3/9/2023  4:51 PM CST -----  Regarding: Nurtec  Contact: MICHAEL CRUZ [[id number]]  Type: Needs Medical Advice    Who Called:  Michael    Symptoms (please be specific):  na    How long has patient had these symptoms:  sarah    Pharmacy name and phone #:  na    Best Call Back Number: [phone] (home)     Additional Information: Has questions about nurtec. Please call to advise.

## 2023-03-10 RX ORDER — BUTALBITAL, ACETAMINOPHEN AND CAFFEINE 50; 325; 40 MG/1; MG/1; MG/1
1 TABLET ORAL EVERY 6 HOURS PRN
Qty: 10 TABLET | Refills: 0 | Status: SHIPPED | OUTPATIENT
Start: 2023-03-10 | End: 2023-04-14

## 2023-03-10 RX ORDER — RIMEGEPANT SULFATE 75 MG/75MG
75 TABLET, ORALLY DISINTEGRATING ORAL EVERY OTHER DAY
Qty: 16 TABLET | Refills: 11 | Status: SHIPPED | OUTPATIENT
Start: 2023-03-10 | End: 2023-09-12 | Stop reason: ALTCHOICE

## 2023-03-10 NOTE — TELEPHONE ENCOUNTER
Called and spoke with patient, discussed the Nurtec as a prevention and the way that it is written now it for abortive. Will send request to NP to change how it is taken and patient also needs refill for the Fioricet. Informed will send that request to. Suggested that she see provider to discuss her issues. Offered virtual appointment for today declined. Offered virtual later in month. Date/Time confirmed. Verbalized understanding.

## 2023-03-13 ENCOUNTER — TELEPHONE (OUTPATIENT)
Dept: PHARMACY | Facility: CLINIC | Age: 33
End: 2023-03-13
Payer: COMMERCIAL

## 2023-03-14 ENCOUNTER — PATIENT MESSAGE (OUTPATIENT)
Dept: NEUROLOGY | Facility: CLINIC | Age: 33
End: 2023-03-14
Payer: COMMERCIAL

## 2023-03-14 DIAGNOSIS — M79.18 CERVICAL MYOFASCIAL PAIN SYNDROME: ICD-10-CM

## 2023-03-14 DIAGNOSIS — G43.719 INTRACTABLE CHRONIC MIGRAINE WITHOUT AURA AND WITHOUT STATUS MIGRAINOSUS: Primary | ICD-10-CM

## 2023-03-16 ENCOUNTER — PATIENT MESSAGE (OUTPATIENT)
Dept: NEUROLOGY | Facility: CLINIC | Age: 33
End: 2023-03-16
Payer: COMMERCIAL

## 2023-03-16 ENCOUNTER — TELEPHONE (OUTPATIENT)
Dept: NEUROLOGY | Facility: CLINIC | Age: 33
End: 2023-03-16
Payer: COMMERCIAL

## 2023-03-16 NOTE — TELEPHONE ENCOUNTER
----- Message from Ariadna Steel, Patient Care Assistant sent at 3/16/2023 10:25 AM CDT -----  Regarding: appointment  Contact: pt  Type: Needs Medical Advice    Who Called:  pt    Symptoms (please be specific):  pain     Best Call Back Number: [phone] (home)     Additional Information: pt states she would like a callback regarding an injection. Please call pt to advise. Thanks!

## 2023-03-17 ENCOUNTER — OFFICE VISIT (OUTPATIENT)
Dept: NEUROLOGY | Facility: CLINIC | Age: 33
End: 2023-03-17
Payer: COMMERCIAL

## 2023-03-17 VITALS
HEART RATE: 89 BPM | RESPIRATION RATE: 20 BRPM | BODY MASS INDEX: 29.97 KG/M2 | SYSTOLIC BLOOD PRESSURE: 132 MMHG | WEIGHT: 180.13 LBS | DIASTOLIC BLOOD PRESSURE: 86 MMHG

## 2023-03-17 DIAGNOSIS — G43.719 INTRACTABLE CHRONIC MIGRAINE WITHOUT AURA AND WITHOUT STATUS MIGRAINOSUS: ICD-10-CM

## 2023-03-17 DIAGNOSIS — M54.81 OCCIPITAL NEURALGIA OF RIGHT SIDE: ICD-10-CM

## 2023-03-17 DIAGNOSIS — M79.18 CERVICAL MYOFASCIAL PAIN SYNDROME: Primary | ICD-10-CM

## 2023-03-17 DIAGNOSIS — G44.40 MEDICATION OVERUSE HEADACHE: ICD-10-CM

## 2023-03-17 PROCEDURE — 99999 PR PBB SHADOW E&M-EST. PATIENT-LVL III: ICD-10-PCS | Mod: PBBFAC,,, | Performed by: NURSE PRACTITIONER

## 2023-03-17 PROCEDURE — 64405 NJX AA&/STRD GR OCPL NRV: CPT | Mod: 59,RT,S$GLB, | Performed by: NURSE PRACTITIONER

## 2023-03-17 PROCEDURE — 1159F MED LIST DOCD IN RCRD: CPT | Mod: CPTII,S$GLB,, | Performed by: NURSE PRACTITIONER

## 2023-03-17 PROCEDURE — 20552 NJX 1/MLT TRIGGER POINT 1/2: CPT | Mod: S$GLB,,, | Performed by: NURSE PRACTITIONER

## 2023-03-17 PROCEDURE — 64405 PR NERVE BLOCK INJ, ANES/STEROID, OCCIPITAL: ICD-10-PCS | Mod: 59,RT,S$GLB, | Performed by: NURSE PRACTITIONER

## 2023-03-17 PROCEDURE — 3079F DIAST BP 80-89 MM HG: CPT | Mod: CPTII,S$GLB,, | Performed by: NURSE PRACTITIONER

## 2023-03-17 PROCEDURE — 3079F PR MOST RECENT DIASTOLIC BLOOD PRESSURE 80-89 MM HG: ICD-10-PCS | Mod: CPTII,S$GLB,, | Performed by: NURSE PRACTITIONER

## 2023-03-17 PROCEDURE — 3075F SYST BP GE 130 - 139MM HG: CPT | Mod: CPTII,S$GLB,, | Performed by: NURSE PRACTITIONER

## 2023-03-17 PROCEDURE — 99999 PR PBB SHADOW E&M-EST. PATIENT-LVL III: CPT | Mod: PBBFAC,,, | Performed by: NURSE PRACTITIONER

## 2023-03-17 PROCEDURE — 99499 NO LOS: ICD-10-PCS | Mod: S$GLB,,, | Performed by: NURSE PRACTITIONER

## 2023-03-17 PROCEDURE — 3008F PR BODY MASS INDEX (BMI) DOCUMENTED: ICD-10-PCS | Mod: CPTII,S$GLB,, | Performed by: NURSE PRACTITIONER

## 2023-03-17 PROCEDURE — 99499 UNLISTED E&M SERVICE: CPT | Mod: S$GLB,,, | Performed by: NURSE PRACTITIONER

## 2023-03-17 PROCEDURE — 1160F RVW MEDS BY RX/DR IN RCRD: CPT | Mod: CPTII,S$GLB,, | Performed by: NURSE PRACTITIONER

## 2023-03-17 PROCEDURE — 3075F PR MOST RECENT SYSTOLIC BLOOD PRESS GE 130-139MM HG: ICD-10-PCS | Mod: CPTII,S$GLB,, | Performed by: NURSE PRACTITIONER

## 2023-03-17 PROCEDURE — 20552 PR INJECT TRIGGER POINT, 1 OR 2: ICD-10-PCS | Mod: S$GLB,,, | Performed by: NURSE PRACTITIONER

## 2023-03-17 PROCEDURE — 1160F PR REVIEW ALL MEDS BY PRESCRIBER/CLIN PHARMACIST DOCUMENTED: ICD-10-PCS | Mod: CPTII,S$GLB,, | Performed by: NURSE PRACTITIONER

## 2023-03-17 PROCEDURE — 1159F PR MEDICATION LIST DOCUMENTED IN MEDICAL RECORD: ICD-10-PCS | Mod: CPTII,S$GLB,, | Performed by: NURSE PRACTITIONER

## 2023-03-17 PROCEDURE — 3008F BODY MASS INDEX DOCD: CPT | Mod: CPTII,S$GLB,, | Performed by: NURSE PRACTITIONER

## 2023-03-17 RX ORDER — TRIAMCINOLONE ACETONIDE 40 MG/ML
40 INJECTION, SUSPENSION INTRA-ARTICULAR; INTRAMUSCULAR
Status: COMPLETED | OUTPATIENT
Start: 2023-03-17 | End: 2023-03-17

## 2023-03-17 RX ORDER — LIDOCAINE HYDROCHLORIDE 10 MG/ML
10 INJECTION, SOLUTION EPIDURAL; INFILTRATION; INTRACAUDAL; PERINEURAL
Status: COMPLETED | OUTPATIENT
Start: 2023-03-17 | End: 2023-03-17

## 2023-03-17 RX ORDER — BUPIVACAINE HYDROCHLORIDE 2.5 MG/ML
10 INJECTION, SOLUTION EPIDURAL; INFILTRATION; INTRACAUDAL
Status: COMPLETED | OUTPATIENT
Start: 2023-03-17 | End: 2023-03-17

## 2023-03-17 RX ADMIN — LIDOCAINE HYDROCHLORIDE 10 MG: 10 INJECTION, SOLUTION EPIDURAL; INFILTRATION; INTRACAUDAL; PERINEURAL at 12:03

## 2023-03-17 RX ADMIN — BUPIVACAINE HYDROCHLORIDE 25 MG: 2.5 INJECTION, SOLUTION EPIDURAL; INFILTRATION; INTRACAUDAL at 12:03

## 2023-03-17 RX ADMIN — TRIAMCINOLONE ACETONIDE 20 MG: 40 INJECTION, SUSPENSION INTRA-ARTICULAR; INTRAMUSCULAR at 12:03

## 2023-03-17 NOTE — PROCEDURES
Procedures  PROCEDURE #1     Greater Occipital Nerve Block, RIGHT      Diagnosis: occipital neuralgia      After risks and benefits were explained including bleeding, infection, worsening of the pain, damage to the area being injected, weakness, allergic reaction to medications, vascular injection, and nerve damage, signed consent was obtained. All questions were answered.      The area of the greater occipital nerve was identified as a point 1/3rds the distance  between the occipital protuberance and the ipsilateral mastoid process. The area of the lesser occipital nerve was identified as a point 2/3rds the distance between the occipital protuberance and the ipsilateral mastoid process.The identified areas were prepped three times with alcohol and the alcohol allowed to dry. Next, a 27 gauge 1 inch needle was placed in the anatomical area of the AMANDA. Once reproduction of the pain was elicited and negative aspiration confirmed, I proceeded with the injection.      Adequacy of the block was confirmed by sensory examination demonstrating anesthesia in the territory of the AMANDA.      Medication used: Marcaine 0.25% (60%), lidocaine 1% (40%) and Triamcinolone 20mg      Total volume injected:  1.2 cc (patient requested minimal injection possible)      Estimated blood loss: none     The patient tolerated the procedure well and there were no complications.       PROCEDURE #2      TRIGGER POINT INJECTION (CERVICAL) 1 muscle bilaterally      Indication: cervical myofascial pain      Anesthesia: none      After risks and benefits were explained including bleeding, infection, worsening of the pain, damage to the area being injected, weakness, allergic reaction to medications, vascular injection, and nerve damage, signed consent was obtained. All questions were answered.      Trigger points were identified by palpation of tight muscle fibers with reproduction of patient's pain and referral pattern upon palpation. The identified  areas were prepped three times with alcohol and the alcohol allowed to dry. Next, a 27 gauge 1 inch needle was placed in the anatomical area of the trigger point ot be injected. Once negative aspiration of air and heme was confirmed, I proceeded with the injection.      Medications used: bupivicaine 0.25% (60%), lidocaine 1% (40%) and Triamcinolone 20mg      Total volume injected: 2 cc      Trigger points injected:    -- left upper trapezius     Estimated blood loss: none      The patient did tolerate the procedure well and there were no complications.    CYNDY Espinoza

## 2023-03-17 NOTE — PATIENT INSTRUCTIONS
Please call our clinic at [phone] or send a message on the [employer] portal if there are any changes to the plan described below, for example,if you are not contacted for the requested tests, referral(s) within one week, if you are unable to receive the medications prescribed, or if you feel you need to change the treatment course for any reason.     TESTING:  -- none    REFERRALS:  -- keep PT appt    PREVENTION (use daily regardless of headache):  -- continue  magnesium in ONE of the following preparations -               1. Magnesium oxide 800mg daily (the most common over the counter kind, may causes loose stools)              2. Magnesium citrate 400-500mg daily (harder to find, but more neutral on the bowels)              3. Magnesium glycinate 400mg daily (hardest to find, look online, but most bowel-neutral, best absorbed)     AS-NEEDED TREATMENT (use total no more than 10 days per month unless otherwise stated):  -- continue naratriptan with next migraine. You can repeat two hours later if needed. Next step would be Nurtec   -- continue Nurtec every other day

## 2023-03-17 NOTE — PROGRESS NOTES
"Date of service: 3/17/2023  Referring provider: No ref. provider found    Subjective:      Chief complaint: Headache       Patient ID: Priscila Vazquez is a 33 y.o. female with anxiety, bruxism, chronic fatigue, arthralgias, hypothyroidism, mitral valve prolapse, migraine who presents for follow up of headache       History of Present Illness    INTERVAL HISTORY 3/17/23    Last visit was a little less than two months ago and at that time plan was to start naratriptan and continue PT.    Today she reports she is the same. Headaches worsen after strenuous activity. Headaches are right sided. Current pain 4 with range 1-8. She has 5-6 headache days per week. She takes Nurtec, naratriptan, Fioricet as needed. She will be starting PT in three days. She believes her headaches are coming from her shoulder, neck and jaw pain. Otherwise information below is reviewed and verified with no changes made     ORIGINAL HEADACHE HISTORY - 1/25/23  Age at onset and course over time: since age 14  Migraines began to worsen after a jaw surgery. Every time she does PT, she has significant improvement while doing PT.    Botox was previously recommended but she reports she "keeps chickening out". She is unsure if she wants to start Botox.     Aura - bright flashing lights, lasts 15 minutes, always followed by migraine     Family history of migraines - mom, maternal aunt  Location: right side  Quality:  [] pressure [] tight [x] throbbing [] sharp [] stabbing   Severity: current 1 with range 0-7  Duration: hours   Frequency: 4-5 days per week  Headaches awaken at night?:    Worst time of day: varies   Associated with: [x] photophobia [x]  phonophobia [] osmophobia [] blurred vision  [] double vision [] loss of appetite [x] nausea [x] vomiting [] dizziness [] vertigo  [] tinnitus [] irritability [] sinus pressure [] problems with concentration   [x] neck tightness   Alleviated by:  [x] sleep [x] darkness [x] massage [x] heat [] ice [x] " medication  Exacerbated by:  [x] fatigue [x] light [x] noise [x] smells [x] coughing [] sneezing  [] bending over [] ovulation [] menses [] alcohol [] change in weather []  stress  Ipsilateral autonomic: [] nasal congestion [] lacrimation [] ptosis [] injection [] edema [] foreign body sensation [] ear fullness   ICP:  [] transient visual obscurations  [] tinnitus   [] positional headache  [x] non-positional   Sleep habits:  Caffeine intake:  Gyn status (if female): not actively trying to conceive     Current acute treatment:  Ativan  Zofran  Naratriptan   Naproxen  Toradol - for severe migraines, once per month  Celebrex  Fioricet - once per month or less  Nurtec - QOD    Current prevention:  None    Previously tried/failed acute treatment:  Tylenol  Rizatriptan - 4-5 times per week    Previously tried/failed preventative treatment:  topiramate - caused eye pain, blurred vision   Duloxetine - tried 10 days, blurred vision but did help headaches   Desvenlafaxine 50mg     Review of patient's allergies indicates:  No Known Allergies  Current Outpatient Medications   Medication Sig Dispense Refill    ARMOUR THYROID 60 mg Tab Take 1 tablet (60 mg total) by mouth once daily. 30 tablet 11    butalbital-acetaminophen-caffeine -40 mg (FIORICET, ESGIC) -40 mg per tablet Take 1 tablet by mouth every 6 (six) hours as needed for Headaches. 10 tablet 0    ketorolac (TORADOL) 10 mg tablet TAKE 2 TABLETS BY MOUTH TWICE DAILY AS NEEDED FOR HEADACHE **NO MORE THAN 2 DAYS PER WEEK** 20 tablet 3    LORazepam (ATIVAN) 1 MG tablet TAKE ONE TABLET BY MOUTH TWO TIMES A DAY AS NEEDED FOR ANXIETY 30 tablet 0    naratriptan (AMERGE) 2.5 MG tablet 2.5 mg at onset of headache, may repeat in 4 hours if needed. No more than 10 days per month. 10 tablet 11    ondansetron (ZOFRAN-ODT) 8 MG TbDL DISSOLVE 1 TABLET IN MOUTH EVERY 6 HOURS AS NEEDED 25 tablet 5    rimegepant (NURTEC) 75 mg odt Take 1 tablet (75 mg total) by mouth every  other day. Place ODT tablet on the tongue; alternatively the ODT tablet may be placed under the tongue 16 tablet 11    celecoxib (ELYXYB) 120 mg/4.8 mL (25 mg/mL) Soln Take 120 mg by mouth daily as needed (migraine. max 1/day.). (Patient not taking: Reported on 3/17/2023) 28.8 mL 5    naproxen sodium (ANAPROX) 550 MG tablet Take 1 tablet (550 mg total) by mouth 2 (two) times daily with meals. (Patient not taking: Reported on 3/17/2023) 20 tablet 0     No current facility-administered medications for this visit.       Past Medical History  Past Medical History:   Diagnosis Date    Atypical Chest Pain 04/2016     Dr. Jeffy Cartagena Ruled Out GB DX With 2016 U/S    Chronic Fatigue     4/22/16 B12 = Normal; 4/19/16 Extensive Labs Reviewed    Chronic Mild Shortness Of Breath     4/19/16 Ordered Echocardiogram    Family H/O Gabino's Disease     Her Mother Has This; 04/2016 Random Cortisol And Cortrosyn Stimulation Test Were Normal    Generalized Arthralgias X 2 Weeks 4/19/16 FBM?    4/22/16 RF, MICHAEL, CPK, ESR = Normal     Generalized Paresthesias     Dr. Guille Cabrera Ruled MS Out 03/2016    Hypothyroidism     On [place] Thyroid; Her Mother Has Hypothyroidism Too    Migraines     Dr. Guille Cabrera; Her Mother Also Has Migraines    Mitral Valve Prolapse Ruled Out     4/22/16 Echocardiogram = Normal But With Thickened MV With Trivial MR    MTHFR Single Mutation     Her Natriopath Tested This 3/7/16    Polycystic Ovarian Syndrome     Dr. Renea Medrano    Situational Anxiety        Past Surgical History  Past Surgical History:   Procedure Laterality Date    MITRAL VALVE SURGERY      patient denies surgery on mitral valve    None         Family History  Family History   Problem Relation Age of Onset    Migraines Mother     Thyroid disease Mother     Breast cancer Maternal Aunt 60       Social History  Social History     Socioeconomic History    Marital status:    Tobacco Use    Smoking status: Never    Smokeless  tobacco: Never   Substance and Sexual Activity    Alcohol use: Yes     Comment: social     Drug use: No    Sexual activity: Never        Review of Systems  14-point review of systems as follows:   No check ynes indicates NEGATIVE response   Constitutional: [] weight loss, [] change to appetite   Eyes: [] change in vision, [] double vision   Ears, nose, mouth, throat: [] frequent nose bleeds, [] ringing in the ears   Respiratory: [] cough, [] wheezing   Cardiovascular: [] chest pain, [] palpitations   Gastrointestinal: [] jaundice, [] nausea/vomiting   Genitourinary: [] incontinence, [] burning with urination   Hematologic/lymphatic: [] easy bruising/bleeding, [] night sweats   Neurological: [] numbness, [] weakness   Endocrine: [] fatigue, [] heat/cold intolerance   Allergy/Immunologic: [] fevers, [] chills   Musculoskeletal: [] muscle pain, [] joint pain   Psychiatric: [] thoughts of harming self/others, [] depression   Integumentary: [] rashes, [] sores that do not heal     Objective:        Vitals:    03/17/23 1134   BP: 132/86   Pulse: 89   Resp: 20       Body mass index is 29.97 kg/m².    3/17/23  Constitutional:   She appears well-developed and well-nourished. She is well groomed     Neurological Exam:  General: well-developed, well-nourished, intermittently tearful  Mental status: Awake and alert  Speech language: No dysarthria or aphasia on conversation  Cranial nerves: Face symmetric  Motor: Moves all extremities well  Coordination: No ataxia. No tremor.    Spine:   CERVICAL SPINE:  ROM: normal   MUSCLE SPASM: bilateral   FACET LOADING:   SPURLING: no  AMANDA / DOMONIQUE tender: right       1/25/23  Constitutional: appears in no acute distress, well-developed, well-nourished     Eyes: normal conjunctiva, PERRLA    Ears, nose, mouth, throat: external appearance of ears and nose normal, hearing intact, tongue scalloping      Cardiovascular: regular rate and rhythm, no murmurs appreciated    Respiratory: unlabored  respirations, breath sounds normal bilaterally    Gastrointestinal: no visible abdominal masses, no guarding, no visible hernia    Musculoskeletal: normal tone in all four extremities. No abnormal movements. No pronator drift. No orbit. Symmetric finger tapping. Normal station. Normal regular gait.     Spine:   CERVICAL SPINE:  ROM: normal   MUSCLE SPASM: R>L   FACET LOADING: no   SPURLING: no  AMANDA / DOMONIQUE tender: right DOMONIQUE     Psychiatric: normal judgment and insight. Oriented to person, place, and time.     Neurologic:   Cortical functions: recent and remote memory intact, normal attention span and concentration, speech fluent, adequate fund of knowledge   Cranial nerves: visual fields full, PERRLA, EOMI, symmetric facial strength, hearing intact, palate elevates symmetrically, shoulder shrug 5/5, tongue protrudes midline   Reflexes: 2+ in the upper, no Rahman  Sensation: intact to temperature throughout   Coordination: normal finger to nose, heel to shin    Data Review:     I have personally reviewed the referring provider's notes, labs, & imaging made available to me today.      RADIOLOGY STUDIES:  I have personally reviewed the pertinent images performed.       Results for orders placed or performed during the hospital encounter of 10/21/19   CT Head Without Contrast    Narrative    EXAMINATION:  CT HEAD WITHOUT CONTRAST    CLINICAL HISTORY:  Headache, acute, norm neuro exam;    TECHNIQUE:  Axial images of the head were obtained without IV contrast administration.  Coronal and sagittal reconstructions were provided.  Three dimensional and MIP images were obtained and evaluated.  Total DLP was 820.64 mGy-cm. Dose lowering technique and automated exposure control were utilized for this exam.    COMPARISON:  CT of the head 01/02/2013.    FINDINGS:  There is normal brain formation.  There is normal gray-white matter differentiation.  There is no hemorrhage, hydrocephalus, or midline shift.  There is no cytotoxic or  vasogenic edema.  There is no intra or extra-axial fluid collection.  There is no herniation.    The calvarium is intact.  There is no fracture.  The bilateral orbits are normal.  The paranasal sinuses and mastoid air cells are normally developed and well aerated.      Impression    No acute intracranial abnormality.      Electronically signed by: Albino Win MD  Date:    10/21/2019  Time:    14:41       Lab Results   Component Value Date     03/12/2022    K 4.2 03/12/2022     03/12/2022    CO2 24 03/12/2022    BUN 10 03/12/2022    CREATININE 0.56 03/12/2022    GLU 91 03/12/2022    AST 23 03/12/2022    AST 15 04/12/2016    ALT 18 03/12/2022    ALBUMIN 4.6 03/12/2022    PROT 7.4 03/12/2022    BILITOT 0.5 03/12/2022    CHOL 148 03/12/2022    HDL 38 (L) 03/12/2022    LDLCALC 97.8 03/12/2022    TRIG 61 03/12/2022       Lab Results   Component Value Date    WBC 7.20 03/12/2022    HGB 13.4 03/12/2022    HCT 39.5 03/12/2022    MCV 88 03/12/2022     03/12/2022       Lab Results   Component Value Date    TSH 0.461 03/12/2022           Assessment & Plan:       Problem List Items Addressed This Visit          Neuro    Intractable chronic migraine without aura and without status migrainosus    Overview     Migraine headaches since age 14 with strong family history. Headaches are typically unilateral, moderate to severe in intensity, worsen with activity, pounding in quality and associated with sensitivity to light and sound.   Gradual progression pattern, lack of red flag features on history, and normal neurological exam are reassuring for primary as opposed to secondary etiology of headaches thus imaging will not be pursued for this history and this exam at this time.    She has 16-20 migraine days per month. We discussed prevention is cruz however she is not ready to start either Botox or CGRP. She wishes to continue PT and following with dentist for TMJ issues.    We discussed need to limit any triptan to 10  days per month or less. Will change to naratriptan. Next step would be Nurtec.          Current Assessment & Plan     Continue Nurtec QOD. She is not ready to start any other medication for prevention.           Other Visit Diagnoses       Cervical myofascial pain syndrome    -  Primary    Follow up with PT    Relevant Medications    BUPivacaine (PF) 0.25% (2.5 mg/ml) injection 25 mg (Completed)    LIDOcaine (PF) 10 mg/ml (1%) injection 10 mg (Completed)    triamcinolone acetonide injection 40 mg (Completed)    Occipital neuralgia of right side        Medication overuse headache        At risk. Triptan, Fioricet, and toradol for abortive                   Please call our clinic at [phone] or send a message on the [employer] portal if there are any changes to the plan described below, for example,if you are not contacted for the requested tests, referral(s) within one week, if you are unable to receive the medications prescribed, or if you feel you need to change the treatment course for any reason.     TESTING:  -- none    REFERRALS:  -- keep PT appt    PREVENTION (use daily regardless of headache):  -- continue  magnesium in ONE of the following preparations -               1. Magnesium oxide 800mg daily (the most common over the counter kind, may causes loose stools)              2. Magnesium citrate 400-500mg daily (harder to find, but more neutral on the bowels)              3. Magnesium glycinate 400mg daily (hardest to find, look online, but most bowel-neutral, best absorbed)     AS-NEEDED TREATMENT (use total no more than 10 days per month unless otherwise stated):  -- continue naratriptan with next migraine. You can repeat two hours later if needed. Next step would be Nurtec   -- continue Nurtec every other day    Follow up for keep routine follow up.    Yolanda Keller, NP

## 2023-05-18 DIAGNOSIS — G43.719 INTRACTABLE CHRONIC MIGRAINE WITHOUT AURA AND WITHOUT STATUS MIGRAINOSUS: ICD-10-CM

## 2023-05-19 RX ORDER — BUTALBITAL, ACETAMINOPHEN AND CAFFEINE 50; 325; 40 MG/1; MG/1; MG/1
1 TABLET ORAL EVERY 6 HOURS PRN
Qty: 10 TABLET | Refills: 0 | Status: SHIPPED | OUTPATIENT
Start: 2023-05-19 | End: 2023-06-19

## 2023-06-19 DIAGNOSIS — G43.719 INTRACTABLE CHRONIC MIGRAINE WITHOUT AURA AND WITHOUT STATUS MIGRAINOSUS: ICD-10-CM

## 2023-06-19 RX ORDER — BUTALBITAL, ACETAMINOPHEN AND CAFFEINE 50; 325; 40 MG/1; MG/1; MG/1
1 TABLET ORAL EVERY 6 HOURS PRN
Qty: 10 TABLET | Refills: 0 | Status: SHIPPED | OUTPATIENT
Start: 2023-06-19 | End: 2023-07-24

## 2023-07-23 DIAGNOSIS — G43.719 INTRACTABLE CHRONIC MIGRAINE WITHOUT AURA AND WITHOUT STATUS MIGRAINOSUS: ICD-10-CM

## 2023-07-24 RX ORDER — BUTALBITAL, ACETAMINOPHEN AND CAFFEINE 50; 325; 40 MG/1; MG/1; MG/1
1 TABLET ORAL EVERY 6 HOURS PRN
Qty: 10 TABLET | Refills: 0 | Status: SHIPPED | OUTPATIENT
Start: 2023-07-24 | End: 2023-09-20

## 2023-09-12 ENCOUNTER — HOSPITAL ENCOUNTER (OUTPATIENT)
Dept: RADIOLOGY | Facility: HOSPITAL | Age: 33
Discharge: HOME | End: 2023-09-12
Payer: COMMERCIAL

## 2023-09-12 ENCOUNTER — OFFICE VISIT (OUTPATIENT)
Dept: UROLOGY | Facility: CLINIC | Age: 33
End: 2023-09-12
Payer: COMMERCIAL

## 2023-09-12 VITALS — WEIGHT: 169.81 LBS | HEIGHT: 65 IN | BODY MASS INDEX: 28.29 KG/M2

## 2023-09-12 DIAGNOSIS — R39.9 UTI SYMPTOMS: ICD-10-CM

## 2023-09-12 DIAGNOSIS — R10.9 BILATERAL FLANK PAIN: ICD-10-CM

## 2023-09-12 DIAGNOSIS — R10.30 LOWER ABDOMINAL PAIN: Primary | ICD-10-CM

## 2023-09-12 DIAGNOSIS — R10.30 LOWER ABDOMINAL PAIN: ICD-10-CM

## 2023-09-12 DIAGNOSIS — B37.9 YEAST INFECTION: ICD-10-CM

## 2023-09-12 PROCEDURE — 99999 PR PBB SHADOW E&M-EST. PATIENT-LVL III: CPT | Mod: PBBFAC,,,

## 2023-09-12 PROCEDURE — 99204 OFFICE O/P NEW MOD 45 MIN: CPT | Mod: S$GLB,,,

## 2023-09-12 PROCEDURE — 1159F MED LIST DOCD IN RCRD: CPT | Mod: CPTII,S$GLB,,

## 2023-09-12 PROCEDURE — 1160F PR REVIEW ALL MEDS BY PRESCRIBER/CLIN PHARMACIST DOCUMENTED: ICD-10-PCS | Mod: CPTII,S$GLB,,

## 2023-09-12 PROCEDURE — 1160F RVW MEDS BY RX/DR IN RCRD: CPT | Mod: CPTII,S$GLB,,

## 2023-09-12 PROCEDURE — 74176 CT ABD & PELVIS W/O CONTRAST: CPT | Mod: 26,,, | Performed by: RADIOLOGY

## 2023-09-12 PROCEDURE — 1159F PR MEDICATION LIST DOCUMENTED IN MEDICAL RECORD: ICD-10-PCS | Mod: CPTII,S$GLB,,

## 2023-09-12 PROCEDURE — 3008F BODY MASS INDEX DOCD: CPT | Mod: CPTII,S$GLB,,

## 2023-09-12 PROCEDURE — 99204 PR OFFICE/OUTPT VISIT, NEW, LEVL IV, 45-59 MIN: ICD-10-PCS | Mod: S$GLB,,,

## 2023-09-12 PROCEDURE — 74176 CT ABD & PELVIS W/O CONTRAST: CPT | Mod: TC,PO

## 2023-09-12 PROCEDURE — 74176 CT ABDOMEN PELVIS WITHOUT CONTRAST: ICD-10-PCS | Mod: 26,,, | Performed by: RADIOLOGY

## 2023-09-12 PROCEDURE — 99999 PR PBB SHADOW E&M-EST. PATIENT-LVL III: ICD-10-PCS | Mod: PBBFAC,,,

## 2023-09-12 PROCEDURE — 3008F PR BODY MASS INDEX (BMI) DOCUMENTED: ICD-10-PCS | Mod: CPTII,S$GLB,,

## 2023-09-12 RX ORDER — LINEZOLID 600 MG/1
600 TABLET, FILM COATED ORAL EVERY 12 HOURS
Qty: 10 TABLET | Refills: 0 | Status: SHIPPED | OUTPATIENT
Start: 2023-09-12 | End: 2023-09-17

## 2023-09-12 RX ORDER — FLUCONAZOLE 150 MG/1
150 TABLET ORAL DAILY
Qty: 2 TABLET | Refills: 0 | Status: SHIPPED | OUTPATIENT
Start: 2023-09-12 | End: 2023-09-14

## 2023-09-12 RX ORDER — BACLOFEN 10 MG/1
10-20 TABLET ORAL 3 TIMES DAILY
COMMUNITY
Start: 2023-08-16

## 2023-09-12 NOTE — PROGRESS NOTES
[facility] Note  Staff: Edith Dimas FNP-C    PCP: MD Duran    Chief Complaint: UTI Symptoms    Subjective:        HPI: Priscila Vazquez is a 33 y.o. female NEW PATIENT presents today for evaluation of UTI symptoms. She states her symptoms started 2 months ago. She was seen by her GYN for some vaginal irritation and dysuria. She was found to have BV and was treated accordingly. She states the dysuria did not resolve and returned to her GYN office around 8/25/2023 and her urine culture was positive for 2 bacteria. She has the results available to review on her personal phone. Her urine culture was positive for 10,000-49,000 count of klebsiella and 50,000-99,000 count of enterococcus. She was treated with a 3-5 day course of bactrim DS. She states she is still feeling some lower abdominal and pelvic pain and bilateral flank pain. She states she is currently taking some amoxicillin her mom had at the house and has been taking this for 4 days. She denies dysuria, hematuria, fever, chills, nausea, vomiting, urgency, frequency, incontinence, and difficulty urinating. She denies a history of kidney stones. She denies constipation.     Questions asked pt during office visit today:  Urgency:No, incontinence with urgency? No;   DysuriaYes   Gross HematuriaNo  History of UTI: Yes     History of Kidney Stones?:  No    Constipation issues?:  No    REVIEW OF SYSTEMS:  Review of Systems   Constitutional: Negative.  Negative for chills and fever.   HENT: Negative.     Eyes: Negative.    Respiratory: Negative.     Cardiovascular: Negative.    Gastrointestinal:  Positive for abdominal pain. Negative for constipation, diarrhea, nausea and vomiting.   Genitourinary:  Positive for flank pain. Negative for dysuria, frequency, hematuria and urgency.   Musculoskeletal:  Positive for back pain.   Skin: Negative.    Neurological: Negative.    Endo/Heme/Allergies: Negative.    Psychiatric/Behavioral: Negative.          PMHx:  Past Medical History:   Diagnosis Date    Atypical Chest Pain 04/2016     Dr. Jeffy Cartagena Ruled Out GB DX With 2016 U/S    Chronic Fatigue     4/22/16 B12 = Normal; 4/19/16 Extensive Labs Reviewed    Chronic Mild Shortness Of Breath     4/19/16 Ordered Echocardiogram    Family H/O Gabino's Disease     Her Mother Has This; 04/2016 Random Cortisol And Cortrosyn Stimulation Test Were Normal    Generalized Arthralgias X 2 Weeks 4/19/16 FBM?    4/22/16 RF, MICHAEL, CPK, ESR = Normal     Generalized Paresthesias     Dr. Guille Cabrera Ruled MS Out 03/2016    Hypothyroidism     On Rhe[place] Thyroid; Her Mother Has Hypothyroidism Too    Migraines     Dr. Guille Cabrera; Her Mother Also Has Migraines    Mitral Valve Prolapse Ruled Out     4/22/16 Echocardiogram = Normal But With Thickened MV With Trivial MR    MTHFR Single Mutation     Her Natriopath Tested This 3/7/16    Polycystic Ovarian Syndrome     Dr. Renea Medrano    Situational Anxiety        PSHx:  Past Surgical History:   Procedure Laterality Date    MITRAL VALVE SURGERY      patient denies surgery on mitral valve    None         Fam Hx:   malignancies: No , gyn malignancies: Yes - maternal aunt breast cancer   kidney stones: No     Soc Hx:  , lives in [place]    Allergies:  Patient has no known allergies.    Medications: reviewed     Objective:   There were no vitals filed for this visit.    Physical Exam  Constitutional:       Appearance: Normal appearance.   HENT:      Head: Normocephalic.      Mouth/Throat:      Mouth: Mucous membranes are moist.   Eyes:      Conjunctiva/sclera: Conjunctivae normal.   Pulmonary:      Effort: Pulmonary effort is normal.   Abdominal:      General: There is no distension.      Palpations: Abdomen is soft.      Tenderness: There is no abdominal tenderness. There is no right CVA tenderness or left CVA tenderness.   Musculoskeletal:         General: Normal range of motion.      Cervical back: Normal range of  motion.   Skin:     General: Skin is warm.   Neurological:      Mental Status: She is alert and oriented to person, place, and time.   Psychiatric:         Mood and Affect: Mood normal.         Behavior: Behavior normal.         LABS REVIEW:  UA today:   Color:Clear, Yellow  Spec. Grav.  1.015  PH  7.0  Negative for leukocytes, nitrates, protein, glucose, ketones, urobili, bili, and blood.    Assessment:       1. Lower abdominal pain    2. Bilateral flank pain    3. Yeast infection    4. UTI symptoms          Plan:      CT ABD/Pelvis wo contrast ordered and scheduled  Linezolid 600mg BID x 5 days prescribed based on last urine culture sensitivity report  Urine culture ordered to be done 5-7 days after finishing antibiotics  Diflucan 150mg prescribed as needed for yeast infection    F/u As Needed per Treatment Plan    [employer]: Active    CYNDY Sanchez

## 2023-09-15 ENCOUNTER — PATIENT MESSAGE (OUTPATIENT)
Dept: UROLOGY | Facility: CLINIC | Age: 33
End: 2023-09-15
Payer: COMMERCIAL

## 2023-09-19 ENCOUNTER — LAB VISIT (OUTPATIENT)
Dept: LAB | Facility: HOSPITAL | Age: 33
End: 2023-09-19
Payer: COMMERCIAL

## 2023-09-19 ENCOUNTER — CLINICAL SUPPORT (OUTPATIENT)
Dept: UROLOGY | Facility: CLINIC | Age: 33
End: 2023-09-19
Payer: COMMERCIAL

## 2023-09-19 DIAGNOSIS — R39.9 UTI SYMPTOMS: Primary | ICD-10-CM

## 2023-09-19 DIAGNOSIS — R39.9 UTI SYMPTOMS: ICD-10-CM

## 2023-09-19 DIAGNOSIS — R10.9 BILATERAL FLANK PAIN: ICD-10-CM

## 2023-09-19 LAB
BILIRUBIN, UA POC OHS: NEGATIVE
BLOOD, UA POC OHS: NEGATIVE
CLARITY, UA POC OHS: CLEAR
COLOR, UA POC OHS: YELLOW
GLUCOSE, UA POC OHS: NEGATIVE
KETONES, UA POC OHS: NEGATIVE
LEUKOCYTES, UA POC OHS: NEGATIVE
NITRITE, UA POC OHS: NEGATIVE
PH, UA POC OHS: 6.5
PROTEIN, UA POC OHS: NEGATIVE
SPECIFIC GRAVITY, UA POC OHS: 1.01
UROBILINOGEN, UA POC OHS: 0.2

## 2023-09-19 PROCEDURE — 99999 PR PBB SHADOW E&M-EST. PATIENT-LVL II: CPT | Mod: PBBFAC,,,

## 2023-09-19 PROCEDURE — 99999 PR PBB SHADOW E&M-EST. PATIENT-LVL II: ICD-10-PCS | Mod: PBBFAC,,,

## 2023-09-19 PROCEDURE — 81003 POCT URINALYSIS(INSTRUMENT): ICD-10-PCS | Mod: QW,S$GLB,,

## 2023-09-19 PROCEDURE — 87086 URINE CULTURE/COLONY COUNT: CPT

## 2023-09-19 PROCEDURE — 81003 URINALYSIS AUTO W/O SCOPE: CPT | Mod: QW,S$GLB,,

## 2023-09-19 NOTE — PROGRESS NOTES
Patient to clinic for in and out cath sample for urine culture. Clear and yellow urine return. Patient tolerated well     Normal

## 2023-09-20 LAB — BACTERIA UR CULT: NO GROWTH

## 2023-09-22 ENCOUNTER — PATIENT MESSAGE (OUTPATIENT)
Dept: UROLOGY | Facility: CLINIC | Age: 33
End: 2023-09-22
Payer: COMMERCIAL

## 2023-09-25 ENCOUNTER — TELEPHONE (OUTPATIENT)
Dept: UROLOGY | Facility: CLINIC | Age: 33
End: 2023-09-25
Payer: COMMERCIAL

## 2023-09-27 ENCOUNTER — TELEPHONE (OUTPATIENT)
Dept: UROLOGY | Facility: CLINIC | Age: 33
End: 2023-09-27
Payer: COMMERCIAL

## 2023-09-27 ENCOUNTER — PATIENT MESSAGE (OUTPATIENT)
Dept: UROLOGY | Facility: CLINIC | Age: 33
End: 2023-09-27
Payer: COMMERCIAL

## 2023-10-12 ENCOUNTER — OFFICE VISIT (OUTPATIENT)
Dept: NEUROLOGY | Facility: CLINIC | Age: 33
End: 2023-10-12
Payer: COMMERCIAL

## 2023-10-12 VITALS
DIASTOLIC BLOOD PRESSURE: 77 MMHG | TEMPERATURE: 98 F | BODY MASS INDEX: 28.47 KG/M2 | SYSTOLIC BLOOD PRESSURE: 118 MMHG | HEIGHT: 65 IN | HEART RATE: 82 BPM | WEIGHT: 170.88 LBS | RESPIRATION RATE: 17 BRPM

## 2023-10-12 DIAGNOSIS — G43.719 INTRACTABLE CHRONIC MIGRAINE WITHOUT AURA AND WITHOUT STATUS MIGRAINOSUS: ICD-10-CM

## 2023-10-12 PROCEDURE — 3078F DIAST BP <80 MM HG: CPT | Mod: CPTII,S$GLB,, | Performed by: NURSE PRACTITIONER

## 2023-10-12 PROCEDURE — 99999 PR PBB SHADOW E&M-EST. PATIENT-LVL IV: CPT | Mod: PBBFAC,,, | Performed by: NURSE PRACTITIONER

## 2023-10-12 PROCEDURE — 3008F PR BODY MASS INDEX (BMI) DOCUMENTED: ICD-10-PCS | Mod: CPTII,S$GLB,, | Performed by: NURSE PRACTITIONER

## 2023-10-12 PROCEDURE — 1159F PR MEDICATION LIST DOCUMENTED IN MEDICAL RECORD: ICD-10-PCS | Mod: CPTII,S$GLB,, | Performed by: NURSE PRACTITIONER

## 2023-10-12 PROCEDURE — 3008F BODY MASS INDEX DOCD: CPT | Mod: CPTII,S$GLB,, | Performed by: NURSE PRACTITIONER

## 2023-10-12 PROCEDURE — 3074F PR MOST RECENT SYSTOLIC BLOOD PRESSURE < 130 MM HG: ICD-10-PCS | Mod: CPTII,S$GLB,, | Performed by: NURSE PRACTITIONER

## 2023-10-12 PROCEDURE — 1159F MED LIST DOCD IN RCRD: CPT | Mod: CPTII,S$GLB,, | Performed by: NURSE PRACTITIONER

## 2023-10-12 PROCEDURE — 99213 PR OFFICE/OUTPT VISIT, EST, LEVL III, 20-29 MIN: ICD-10-PCS | Mod: S$GLB,,, | Performed by: NURSE PRACTITIONER

## 2023-10-12 PROCEDURE — 3074F SYST BP LT 130 MM HG: CPT | Mod: CPTII,S$GLB,, | Performed by: NURSE PRACTITIONER

## 2023-10-12 PROCEDURE — 1160F PR REVIEW ALL MEDS BY PRESCRIBER/CLIN PHARMACIST DOCUMENTED: ICD-10-PCS | Mod: CPTII,S$GLB,, | Performed by: NURSE PRACTITIONER

## 2023-10-12 PROCEDURE — 3078F PR MOST RECENT DIASTOLIC BLOOD PRESSURE < 80 MM HG: ICD-10-PCS | Mod: CPTII,S$GLB,, | Performed by: NURSE PRACTITIONER

## 2023-10-12 PROCEDURE — 99999 PR PBB SHADOW E&M-EST. PATIENT-LVL IV: ICD-10-PCS | Mod: PBBFAC,,, | Performed by: NURSE PRACTITIONER

## 2023-10-12 PROCEDURE — 1160F RVW MEDS BY RX/DR IN RCRD: CPT | Mod: CPTII,S$GLB,, | Performed by: NURSE PRACTITIONER

## 2023-10-12 PROCEDURE — 99213 OFFICE O/P EST LOW 20 MIN: CPT | Mod: S$GLB,,, | Performed by: NURSE PRACTITIONER

## 2023-10-12 RX ORDER — BUTALBITAL, ACETAMINOPHEN AND CAFFEINE 50; 325; 40 MG/1; MG/1; MG/1
1 TABLET ORAL EVERY 6 HOURS PRN
Qty: 10 TABLET | Refills: 0 | Status: SHIPPED | OUTPATIENT
Start: 2023-10-12 | End: 2023-11-27

## 2023-10-12 RX ORDER — NARATRIPTAN 2.5 MG/1
TABLET ORAL
Qty: 10 TABLET | Refills: 11 | Status: SHIPPED | OUTPATIENT
Start: 2023-10-12

## 2023-10-12 NOTE — PATIENT INSTRUCTIONS
Please call our clinic at [phone] or send a message on the [employer] portal if there are any changes to the plan described below, for example,if you are not contacted for the requested tests, referral(s) within one week, if you are unable to receive the medications prescribed, or if you feel you need to change the treatment course for any reason.     TESTING:  -- none    REFERRALS:  -- none     PREVENTION (use daily regardless of headache):  -- continue  magnesium in ONE of the following preparations -               1. Magnesium oxide 800mg daily (the most common over the counter kind, may causes loose stools)              2. Magnesium citrate 400-500mg daily (harder to find, but more neutral on the bowels)              3. Magnesium glycinate 400mg daily (hardest to find, look online, but most bowel-neutral, best absorbed)     AS-NEEDED TREATMENT (use total no more than 10 days per month unless otherwise stated):  -- continue naratriptan with next migraine. You can repeat two hours later if needed. Next step would be Nurtec   -- continue Nurtec every other day

## 2023-10-12 NOTE — PROGRESS NOTES
"Date of service: 10/12/2023  Referring provider: No ref. provider found    Subjective:      Chief complaint: Headache       Patient ID: Priscila Vazquez is a 33 y.o. female with anxiety, bruxism, chronic fatigue, arthralgias, hypothyroidism, mitral valve prolapse, migraine who presents for follow up of headache       History of Present Illness    INTERVAL HISTORY 10/12/23    Last visit was three months ago and at that time we did a nerve block.    Today she reports she is better. Current pain 0 with range 0-7. She has 3-5 headache days per week. She takes amerge and Fioricet 3-4 days per week.  Otherwise information below is reviewed and verified with no changes made     INTERVAL HISTORY 3/17/23    Last visit was a little less than two months ago and at that time plan was to start naratriptan and continue PT.    Today she reports she is the same. Headaches worsen after strenuous activity. Headaches are right sided. Current pain 4 with range 1-8. She has 5-6 headache days per week. She takes Nurtec, naratriptan, Fioricet as needed. She will be starting PT in three days. She believes her headaches are coming from her shoulder, neck and jaw pain. Otherwise information below is reviewed and verified with no changes made     ORIGINAL HEADACHE HISTORY - 1/25/23  Age at onset and course over time: since age 14  Migraines began to worsen after a jaw surgery. Every time she does PT, she has significant improvement while doing PT.    Botox was previously recommended but she reports she "keeps chickening out". She is unsure if she wants to start Botox.     Aura - bright flashing lights, lasts 15 minutes, always followed by migraine     Family history of migraines - mom, maternal aunt  Location: right side  Quality:  [] pressure [] tight [x] throbbing [] sharp [] stabbing   Severity: current 1 with range 0-7  Duration: hours   Frequency: 4-5 days per week  Headaches awaken at night?:    Worst time of day: varies   Associated with: " [x] photophobia [x]  phonophobia [] osmophobia [] blurred vision  [] double vision [] loss of appetite [x] nausea [x] vomiting [] dizziness [] vertigo  [] tinnitus [] irritability [] sinus pressure [] problems with concentration   [x] neck tightness   Alleviated by:  [x] sleep [x] darkness [x] massage [x] heat [] ice [x] medication  Exacerbated by:  [x] fatigue [x] light [x] noise [x] smells [x] coughing [] sneezing  [] bending over [] ovulation [] menses [] alcohol [] change in weather []  stress  Ipsilateral autonomic: [] nasal congestion [] lacrimation [] ptosis [] injection [] edema [] foreign body sensation [] ear fullness   ICP:  [] transient visual obscurations  [] tinnitus   [] positional headache  [x] non-positional   Sleep habits:  Caffeine intake:  Gyn status (if female): not actively trying to conceive     Current acute treatment:  Ativan  Zofran  Naratriptan   Naproxen  Toradol - for severe migraines, once per month  Celebrex  Fioricet - once per month or less  Nurtec - QOD    Current prevention:  None    Previously tried/failed acute treatment:  Tylenol  Rizatriptan - 4-5 times per week    Previously tried/failed preventative treatment:  topiramate - caused eye pain, blurred vision   Duloxetine - tried 10 days, blurred vision but did help headaches   Desvenlafaxine 50mg     Review of patient's allergies indicates:  No Known Allergies  Current Outpatient Medications   Medication Sig Dispense Refill    ARMOUR THYROID 60 mg Tab Take 1 tablet (60 mg total) by mouth once daily. 30 tablet 11    baclofen (LIORESAL) 10 MG tablet Take 10-20 mg by mouth 3 (three) times daily.      celecoxib (ELYXYB) 120 mg/4.8 mL (25 mg/mL) Soln Take 120 mg by mouth daily as needed (migraine. max 1/day.). 28.8 mL 5    ondansetron (ZOFRAN-ODT) 8 MG TbDL DISSOLVE 1 TABLET IN MOUTH EVERY 6 HOURS AS NEEDED 25 tablet 5    butalbital-acetaminophen-caffeine -40 mg (FIORICET, ESGIC) -40 mg per tablet Take 1 tablet by mouth  every 6 (six) hours as needed for Headaches. 10 tablet 0    naratriptan (AMERGE) 2.5 MG tablet 2.5 mg at onset of headache, may repeat in 4 hours if needed. No more than 10 days per month. 10 tablet 11     No current facility-administered medications for this visit.       Past Medical History  Past Medical History:   Diagnosis Date    Atypical Chest Pain 04/2016     Dr. Jeffy Cartagena Ruled Out GB DX With 2016 U/S    Chronic Fatigue     4/22/16 B12 = Normal; 4/19/16 Extensive Labs Reviewed    Chronic Mild Shortness Of Breath     4/19/16 Ordered Echocardiogram    Family H/O [place]'s Disease     Her Mother Has This; 04/2016 Random Cortisol And Cortrosyn Stimulation Test Were Normal    Generalized Arthralgias X 2 Weeks 4/19/16 FBM?    4/22/16 RF, MICHAEL, CPK, ESR = Normal     Generalized Paresthesias     Dr. Guille Cabrera Ruled MS Out 03/2016    Hypothyroidism     On [place] Thyroid; Her Mother Has Hypothyroidism Too    Migraines     Dr. Guille Cabrera; Her Mother Also Has Migraines    Mitral Valve Prolapse Ruled Out     4/22/16 Echocardiogram = Normal But With Thickened MV With Trivial MR    MTHFR Single Mutation     Her Natriopath Tested This 3/7/16    Polycystic Ovarian Syndrome     Dr. Renea Medrano    Situational Anxiety        Past Surgical History  Past Surgical History:   Procedure Laterality Date    MITRAL VALVE SURGERY      patient denies surgery on mitral valve    None         Family History  Family History   Problem Relation Age of Onset    Migraines Mother     Thyroid disease Mother     Breast cancer Maternal Aunt 60       Social History  Social History     Socioeconomic History    Marital status:    Tobacco Use    Smoking status: Never    Smokeless tobacco: Never   Substance and Sexual Activity    Alcohol use: Yes     Comment: social     Drug use: No    Sexual activity: Never        Objective:        Vitals:    10/12/23 0908   BP: 118/77   Pulse: 82   Resp: 17   Temp: 97.8 °F (36.6 °C)         Body  mass index is 28.43 kg/m².    10/12/23  Constitutional:   She appears well-developed and well-nourished. She is well groomed     Neurological Exam:  General: well-developed, well-nourished, intermittently tearful  Mental status: Awake and alert  Speech language: No dysarthria or aphasia on conversation  Cranial nerves: Face symmetric  Motor: Moves all extremities well  Coordination: No ataxia. No tremor.    1/25/23  Constitutional: appears in no acute distress, well-developed, well-nourished     Eyes: normal conjunctiva, PERRLA    Ears, nose, mouth, throat: external appearance of ears and nose normal, hearing intact, tongue scalloping      Cardiovascular: regular rate and rhythm, no murmurs appreciated    Respiratory: unlabored respirations, breath sounds normal bilaterally    Gastrointestinal: no visible abdominal masses, no guarding, no visible hernia    Musculoskeletal: normal tone in all four extremities. No abnormal movements. No pronator drift. No orbit. Symmetric finger tapping. Normal station. Normal regular gait.     Spine:   CERVICAL SPINE:  ROM: normal   MUSCLE SPASM: R>L   FACET LOADING: no   SPURLING: no  AMANDA / DOMONIQUE tender: right DOMONIQUE     Psychiatric: normal judgment and insight. Oriented to person, place, and time.     Neurologic:   Cortical functions: recent and remote memory intact, normal attention span and concentration, speech fluent, adequate fund of knowledge   Cranial nerves: visual fields full, PERRLA, EOMI, symmetric facial strength, hearing intact, palate elevates symmetrically, shoulder shrug 5/5, tongue protrudes midline   Reflexes: 2+ in the upper, no Rahman  Sensation: intact to temperature throughout   Coordination: normal finger to nose, heel to shin    Data Review:     I have personally reviewed the referring provider's notes, labs, & imaging made available to me today.      RADIOLOGY STUDIES:  I have personally reviewed the pertinent images performed.       Results for orders placed  or performed during the hospital encounter of 10/21/19   CT Head Without Contrast    Narrative    EXAMINATION:  CT HEAD WITHOUT CONTRAST    CLINICAL HISTORY:  Headache, acute, norm neuro exam;    TECHNIQUE:  Axial images of the head were obtained without IV contrast administration.  Coronal and sagittal reconstructions were provided.  Three dimensional and MIP images were obtained and evaluated.  Total DLP was 820.64 mGy-cm. Dose lowering technique and automated exposure control were utilized for this exam.    COMPARISON:  CT of the head 01/02/2013.    FINDINGS:  There is normal brain formation.  There is normal gray-white matter differentiation.  There is no hemorrhage, hydrocephalus, or midline shift.  There is no cytotoxic or vasogenic edema.  There is no intra or extra-axial fluid collection.  There is no herniation.    The calvarium is intact.  There is no fracture.  The bilateral orbits are normal.  The paranasal sinuses and mastoid air cells are normally developed and well aerated.      Impression    No acute intracranial abnormality.      Electronically signed by: Albino Win MD  Date:    10/21/2019  Time:    14:41       Lab Results   Component Value Date     04/13/2023    K 4.3 04/13/2023     04/13/2023    CO2 27 04/13/2023    BUN 9 04/13/2023    CREATININE 0.68 04/13/2023     04/13/2023    AST 23 04/13/2023    AST 15 04/12/2016    ALT 19 04/13/2023    ALBUMIN 4.9 04/13/2023    PROT 8.1 04/13/2023    BILITOT 0.6 04/13/2023    CHOL 204 (H) 04/13/2023    HDL 48 04/13/2023    LDLCALC 136.4 04/13/2023    TRIG 98 04/13/2023       Lab Results   Component Value Date    WBC 6.59 04/13/2023    HGB 14.3 04/13/2023    HCT 41.5 04/13/2023    MCV 87 04/13/2023     04/13/2023       Lab Results   Component Value Date    TSH 1.350 04/13/2023           Assessment & Plan:       Problem List Items Addressed This Visit          Neuro    Intractable chronic migraine without aura and without status  migrainosus    Overview     Migraine headaches since age 14 with strong family history. Headaches are typically unilateral, moderate to severe in intensity, worsen with activity, pounding in quality and associated with sensitivity to light and sound.   Gradual progression pattern, lack of red flag features on history, and normal neurological exam are reassuring for primary as opposed to secondary etiology of headaches thus imaging will not be pursued for this history and this exam at this time.    She has 16-20 migraine days per month. We discussed prevention is cruz however she is not ready to start either Botox or CGRP. She wishes to continue PT and following with dentist for TMJ issues.    We discussed need to limit any triptan to 10 days per month or less. Will change to naratriptan. Next step would be Nurtec.          Current Assessment & Plan     She is moving out of [place] tomorrow. Will not make any changes. Continue current plan.          Relevant Medications    naratriptan (AMERGE) 2.5 MG tablet    butalbital-acetaminophen-caffeine -40 mg (FIORICET, ESGIC) -40 mg per tablet               Please call our clinic at [phone] or send a message on the [facility] portal if there are any changes to the plan described below, for example,if you are not contacted for the requested tests, referral(s) within one week, if you are unable to receive the medications prescribed, or if you feel you need to change the treatment course for any reason.     TESTING:  -- none    REFERRALS:  -- none     PREVENTION (use daily regardless of headache):  -- continue  magnesium in ONE of the following preparations -               1. Magnesium oxide 800mg daily (the most common over the counter kind, may causes loose stools)              2. Magnesium citrate 400-500mg daily (harder to find, but more neutral on the bowels)              3. Magnesium glycinate 400mg daily (hardest to find, look online, but most bowel-neutral,  best absorbed)     AS-NEEDED TREATMENT (use total no more than 10 days per month unless otherwise stated):  -- continue naratriptan with next migraine. You can repeat two hours later if needed. Next step would be Nurtec   -- continue Nurtec every other day    Follow up in about 1 year (around 10/12/2024).    Yolanda Keller, NP

## 2023-11-27 DIAGNOSIS — G43.719 INTRACTABLE CHRONIC MIGRAINE WITHOUT AURA AND WITHOUT STATUS MIGRAINOSUS: ICD-10-CM

## 2023-11-27 RX ORDER — BUTALBITAL, ACETAMINOPHEN AND CAFFEINE 50; 325; 40 MG/1; MG/1; MG/1
1 TABLET ORAL EVERY 6 HOURS PRN
Qty: 10 TABLET | Refills: 0 | Status: SHIPPED | OUTPATIENT
Start: 2023-11-27 | End: 2024-01-10

## 2024-01-10 DIAGNOSIS — G43.719 INTRACTABLE CHRONIC MIGRAINE WITHOUT AURA AND WITHOUT STATUS MIGRAINOSUS: ICD-10-CM

## 2024-01-10 RX ORDER — BUTALBITAL, ACETAMINOPHEN AND CAFFEINE 50; 325; 40 MG/1; MG/1; MG/1
1 TABLET ORAL EVERY 6 HOURS PRN
Qty: 10 TABLET | Refills: 0 | Status: SHIPPED | OUTPATIENT
Start: 2024-01-10 | End: 2024-02-14 | Stop reason: SDUPTHER

## 2024-02-14 DIAGNOSIS — G43.719 INTRACTABLE CHRONIC MIGRAINE WITHOUT AURA AND WITHOUT STATUS MIGRAINOSUS: ICD-10-CM

## 2024-02-14 RX ORDER — BUTALBITAL, ACETAMINOPHEN AND CAFFEINE 50; 325; 40 MG/1; MG/1; MG/1
1 TABLET ORAL EVERY 6 HOURS PRN
Qty: 10 TABLET | Refills: 0 | Status: SHIPPED | OUTPATIENT
Start: 2024-02-14 | End: 2024-03-12

## 2024-03-12 DIAGNOSIS — G43.719 INTRACTABLE CHRONIC MIGRAINE WITHOUT AURA AND WITHOUT STATUS MIGRAINOSUS: ICD-10-CM

## 2024-03-12 RX ORDER — BUTALBITAL, ACETAMINOPHEN AND CAFFEINE 50; 325; 40 MG/1; MG/1; MG/1
1 TABLET ORAL EVERY 6 HOURS PRN
Qty: 10 TABLET | Refills: 0 | Status: SHIPPED | OUTPATIENT
Start: 2024-03-12 | End: 2024-04-15

## 2024-04-15 DIAGNOSIS — G43.719 INTRACTABLE CHRONIC MIGRAINE WITHOUT AURA AND WITHOUT STATUS MIGRAINOSUS: ICD-10-CM

## 2024-04-15 RX ORDER — BUTALBITAL, ACETAMINOPHEN AND CAFFEINE 50; 325; 40 MG/1; MG/1; MG/1
1 TABLET ORAL EVERY 6 HOURS PRN
Qty: 10 TABLET | Refills: 2 | Status: SHIPPED | OUTPATIENT
Start: 2024-04-15

## 2024-06-27 ENCOUNTER — OFFICE VISIT (OUTPATIENT)
Dept: NEUROLOGY | Facility: CLINIC | Age: 34
End: 2024-06-27
Payer: COMMERCIAL

## 2024-06-27 VITALS
HEART RATE: 75 BPM | BODY MASS INDEX: 26.76 KG/M2 | TEMPERATURE: 97 F | SYSTOLIC BLOOD PRESSURE: 113 MMHG | WEIGHT: 160.63 LBS | DIASTOLIC BLOOD PRESSURE: 76 MMHG | HEIGHT: 65 IN | RESPIRATION RATE: 16 BRPM

## 2024-06-27 DIAGNOSIS — F45.8 BRUXISM: ICD-10-CM

## 2024-06-27 DIAGNOSIS — G43.719 INTRACTABLE CHRONIC MIGRAINE WITHOUT AURA AND WITHOUT STATUS MIGRAINOSUS: Primary | ICD-10-CM

## 2024-06-27 DIAGNOSIS — M79.18 CERVICAL MYOFASCIAL PAIN SYNDROME: ICD-10-CM

## 2024-06-27 DIAGNOSIS — M26.69 TMJ CAPSULITIS: ICD-10-CM

## 2024-06-27 PROCEDURE — 3074F SYST BP LT 130 MM HG: CPT | Mod: CPTII,S$GLB,, | Performed by: NURSE PRACTITIONER

## 2024-06-27 PROCEDURE — 1159F MED LIST DOCD IN RCRD: CPT | Mod: CPTII,S$GLB,, | Performed by: NURSE PRACTITIONER

## 2024-06-27 PROCEDURE — 3078F DIAST BP <80 MM HG: CPT | Mod: CPTII,S$GLB,, | Performed by: NURSE PRACTITIONER

## 2024-06-27 PROCEDURE — 99999 PR PBB SHADOW E&M-EST. PATIENT-LVL IV: CPT | Mod: PBBFAC,,, | Performed by: NURSE PRACTITIONER

## 2024-06-27 PROCEDURE — 3008F BODY MASS INDEX DOCD: CPT | Mod: CPTII,S$GLB,, | Performed by: NURSE PRACTITIONER

## 2024-06-27 PROCEDURE — 99214 OFFICE O/P EST MOD 30 MIN: CPT | Mod: S$GLB,,, | Performed by: NURSE PRACTITIONER

## 2024-06-27 RX ORDER — LORAZEPAM 0.5 MG/1
TABLET ORAL
COMMUNITY

## 2024-06-27 RX ORDER — BUTALBITAL, ACETAMINOPHEN AND CAFFEINE 50; 325; 40 MG/1; MG/1; MG/1
1 TABLET ORAL EVERY 6 HOURS PRN
Qty: 10 TABLET | Refills: 0 | Status: SHIPPED | OUTPATIENT
Start: 2024-06-27

## 2024-06-27 RX ORDER — CLASCOTERONE 1 G/100G
CREAM TOPICAL
COMMUNITY

## 2024-06-27 RX ORDER — TIZANIDINE 4 MG/1
TABLET ORAL
Qty: 30 TABLET | Refills: 11 | Status: SHIPPED | OUTPATIENT
Start: 2024-06-27

## 2024-06-27 RX ORDER — CLINDAMYCIN AND BENZOYL PEROXIDE 10; 50 MG/G; MG/G
GEL TOPICAL
COMMUNITY
Start: 2024-04-09

## 2024-06-27 RX ORDER — CLOBETASOL PROPIONATE 0.5 MG/G
OINTMENT TOPICAL
COMMUNITY
Start: 2024-06-04

## 2024-06-27 RX ORDER — NARATRIPTAN 2.5 MG/1
TABLET ORAL
Qty: 10 TABLET | Refills: 11 | Status: SHIPPED | OUTPATIENT
Start: 2024-06-27

## 2024-06-27 RX ORDER — KETOROLAC TROMETHAMINE 10 MG/1
1 TABLET, FILM COATED ORAL DAILY PRN
COMMUNITY

## 2024-06-27 RX ORDER — FLUCONAZOLE 150 MG/1
TABLET ORAL
COMMUNITY
Start: 2024-06-04

## 2024-06-27 NOTE — PROGRESS NOTES
"Date of service: 6/27/2024  Referring provider: No ref. provider found    Subjective:      Chief complaint: Headache       Patient ID: Priscila Vazquez is a 34 y.o. female with anxiety, bruxism, chronic fatigue, arthralgias, hypothyroidism, mitral valve prolapse, migraine who presents for follow up of headache       History of Present Illness    INTERVAL HISTORY 6/27/24    Last visit was eight months ago and at that time she was doing better.    Today she reports she is doing well. Current pain 0 with range 0-7. She has 3-4 headache days per week. She takes naratriptan, Advil, Fioricet 3-4 days per week. Otherwise information below is reviewed and verified with no changes made     INTERVAL HISTORY 10/12/23    Last visit was three months ago and at that time we did a nerve block.    Today she reports she is better. Current pain 0 with range 0-7. She has 3-5 headache days per week. She takes amerge and Fioricet 3-4 days per week.  Otherwise information below is reviewed and verified with no changes made     INTERVAL HISTORY 3/17/23    Last visit was a little less than two months ago and at that time plan was to start naratriptan and continue PT.    Today she reports she is the same. Headaches worsen after strenuous activity. Headaches are right sided. Current pain 4 with range 1-8. She has 5-6 headache days per week. She takes Nurtec, naratriptan, Fioricet as needed. She will be starting PT in three days. She believes her headaches are coming from her shoulder, neck and jaw pain. Otherwise information below is reviewed and verified with no changes made     ORIGINAL HEADACHE HISTORY - 1/25/23  Age at onset and course over time: since age 14  Migraines began to worsen after a jaw surgery. Every time she does PT, she has significant improvement while doing PT.    Botox was previously recommended but she reports she "keeps chickening out". She is unsure if she wants to start Botox.     Aura - bright flashing lights, lasts 15 " minutes, always followed by migraine     Family history of migraines - mom, maternal aunt  Location: right side  Quality:  [] pressure [] tight [x] throbbing [] sharp [] stabbing   Severity: current 1 with range 0-7  Duration: hours   Frequency: 4-5 days per week  Headaches awaken at night?:    Worst time of day: varies   Associated with: [x] photophobia [x]  phonophobia [] osmophobia [] blurred vision  [] double vision [] loss of appetite [x] nausea [x] vomiting [] dizziness [] vertigo  [] tinnitus [] irritability [] sinus pressure [] problems with concentration   [x] neck tightness   Alleviated by:  [x] sleep [x] darkness [x] massage [x] heat [] ice [x] medication  Exacerbated by:  [x] fatigue [x] light [x] noise [x] smells [x] coughing [] sneezing  [] bending over [] ovulation [] menses [] alcohol [] change in weather []  stress  Ipsilateral autonomic: [] nasal congestion [] lacrimation [] ptosis [] injection [] edema [] foreign body sensation [] ear fullness   ICP:  [] transient visual obscurations  [] tinnitus   [] positional headache  [x] non-positional   Sleep habits:  Caffeine intake:  Gyn status (if female): not actively trying to conceive     Current acute treatment:  Zofran  Naratriptan   Naproxen  Toradol - for severe migraines, once per month  Celebrex  Fioricet - once per month or less    Current prevention:  Zoloft - weaning off     Previously tried/failed acute treatment:  Tylenol  Rizatriptan - 4-5 times per week  Ativan  Nurtec - QOD - stopped menstrual cycle ?  Baclofen  Flexeril  Tizanidine    Previously tried/failed preventative treatment:  topiramate - caused eye pain, blurred vision   Duloxetine - tried 10 days, blurred vision but did help headaches   Desvenlafaxine 50mg     Review of patient's allergies indicates:  No Known Allergies  Current Outpatient Medications   Medication Sig Dispense Refill    ARMOUR THYROID 60 mg Tab Take 1 tablet (60 mg total) by mouth once daily. 30 tablet 11     celecoxib (ELYXYB) 120 mg/4.8 mL (25 mg/mL) Soln Take 120 mg by mouth daily as needed (migraine. max 1/day.). 28.8 mL 5    clindamycin-benzoyl peroxide (BENZACLIN) gel SMARTSIG:Sparingly Topical Every Morning      clobetasol 0.05% (TEMOVATE) 0.05 % Oint SMARTSIG:Sparingly Topical Twice Daily      fluconazole (DIFLUCAN) 150 MG Tab TAKE 1 TABLET BY MOUTH EVERY 72 HOURS      ketorolac (TORADOL) 10 mg tablet Take 1 tablet by mouth daily as needed.      LORazepam (ATIVAN) 0.5 MG tablet TAKE 1-2 TABLETS BY MOUTH DAILY AS NEEDED      ondansetron (ZOFRAN-ODT) 8 MG TbDL DISSOLVE 1 TABLET IN MOUTH EVERY 6 HOURS AS NEEDED 25 tablet 5    WINLEVI 1 % Crea APPLY A SMALL AMOUNT TO AFFECTED AREA TWICE A DAY CAN SPOT TREAT      butalbital-acetaminophen-caffeine -40 mg (FIORICET, ESGIC) -40 mg per tablet Take 1 tablet by mouth every 6 (six) hours as needed for Headaches. 10 tablet 0    naratriptan (AMERGE) 2.5 MG tablet 2.5 mg at onset of headache, may repeat in 4 hours if needed. No more than 10 days per month. 10 tablet 11    tiZANidine (ZANAFLEX) 4 MG tablet Half or full tablet by mouth at night as needed for muscle spasm 30 tablet 11     No current facility-administered medications for this visit.       Past Medical History  Past Medical History:   Diagnosis Date    Atypical Chest Pain 04/2016     Dr. Jeffy Cartagena Ruled Out GB DX With 2016 U/S    Chronic Fatigue     4/22/16 B12 = Normal; 4/19/16 Extensive Labs Reviewed    Chronic Mild Shortness Of Breath     4/19/16 Ordered Echocardiogram    Family H/O Gabino's Disease     Her Mother Has This; 04/2016 Random Cortisol And Cortrosyn Stimulation Test Were Normal    Generalized Arthralgias X 2 Weeks 4/19/16 FBM?    4/22/16 RF, MICHAEL, CPK, ESR = Normal     Generalized Paresthesias     Dr. Guille Cabrera Ruled MS Out 03/2016    Hypothyroidism     On [place] Thyroid; Her Mother Has Hypothyroidism Too    Migraines     Dr. Guille Cabrera; Her Mother Also Has Migraines    Mitral  Valve Prolapse Ruled Out     4/22/16 Echocardiogram = Normal But With Thickened MV With Trivial MR    MTHFR Single Mutation     Her Natriopath Tested This 3/7/16    Polycystic Ovarian Syndrome     Dr. Renea Medrano    Situational Anxiety        Past Surgical History  Past Surgical History:   Procedure Laterality Date    MITRAL VALVE SURGERY      patient denies surgery on mitral valve    None         Family History  Family History   Problem Relation Name Age of Onset    Migraines Mother      Thyroid disease Mother      Breast cancer Maternal Aunt  60       Social History  Social History     Socioeconomic History    Marital status:    Tobacco Use    Smoking status: Never    Smokeless tobacco: Never   Substance and Sexual Activity    Alcohol use: Yes     Comment: social     Drug use: No    Sexual activity: Never        Objective:        Vitals:    06/27/24 0840   BP: 113/76   Pulse: 75   Resp: 16   Temp: 97.4 °F (36.3 °C)           Body mass index is 26.73 kg/m².    6/27/24  Constitutional:   She appears well-developed and well-nourished. She is well groomed     Neurological Exam:  General: well-developed, well-nourished, intermittently tearful  Mental status: Awake and alert  Speech language: No dysarthria or aphasia on conversation  Cranial nerves: Face symmetric  Motor: Moves all extremities well  Coordination: No ataxia. No tremor.  Data Review:     I have personally reviewed the referring provider's notes, labs, & imaging made available to me today.      RADIOLOGY STUDIES:  I have personally reviewed the pertinent images performed.       Results for orders placed or performed during the hospital encounter of 10/21/19   CT Head Without Contrast    Narrative    EXAMINATION:  CT HEAD WITHOUT CONTRAST    CLINICAL HISTORY:  Headache, acute, norm neuro exam;    TECHNIQUE:  Axial images of the head were obtained without IV contrast administration.  Coronal and sagittal reconstructions were provided.  Three  dimensional and MIP images were obtained and evaluated.  Total DLP was 820.64 mGy-cm. Dose lowering technique and automated exposure control were utilized for this exam.    COMPARISON:  CT of the head 01/02/2013.    FINDINGS:  There is normal brain formation.  There is normal gray-white matter differentiation.  There is no hemorrhage, hydrocephalus, or midline shift.  There is no cytotoxic or vasogenic edema.  There is no intra or extra-axial fluid collection.  There is no herniation.    The calvarium is intact.  There is no fracture.  The bilateral orbits are normal.  The paranasal sinuses and mastoid air cells are normally developed and well aerated.      Impression    No acute intracranial abnormality.      Electronically signed by: Albino Win MD  Date:    10/21/2019  Time:    14:41       Lab Results   Component Value Date     04/13/2023    K 4.3 04/13/2023     04/13/2023    CO2 27 04/13/2023    BUN 9 04/13/2023    CREATININE 0.68 04/13/2023     04/13/2023    AST 23 04/13/2023    AST 15 04/12/2016    ALT 19 04/13/2023    ALBUMIN 4.9 04/13/2023    PROT 8.1 04/13/2023    BILITOT 0.6 04/13/2023    CHOL 204 (H) 04/13/2023    HDL 48 04/13/2023    LDLCALC 136.4 04/13/2023    TRIG 98 04/13/2023       Lab Results   Component Value Date    WBC 6.59 04/13/2023    HGB 14.3 04/13/2023    HCT 41.5 04/13/2023    MCV 87 04/13/2023     04/13/2023       Lab Results   Component Value Date    TSH 1.350 04/13/2023           Assessment & Plan:       Problem List Items Addressed This Visit          Neuro    Intractable chronic migraine without aura and without status migrainosus - Primary    Overview     Migraine headaches since age 14 with strong family history. Headaches are typically unilateral, moderate to severe in intensity, worsen with activity, pounding in quality and associated with sensitivity to light and sound.   Gradual progression pattern, lack of red flag features on history, and normal  neurological exam are reassuring for primary as opposed to secondary etiology of headaches thus imaging will not be pursued for this history and this exam at this time.    She has about 16 migraine days per month. We discussed prevention is cruz however she is not ready to start either Botox or CGRP. She wishes to continue PT and following with dentist for TMJ issues.    We discussed need to limit any triptan to 10 days per month or less. Naratriptan has been effective. Nurtec caused side effects.         Current Assessment & Plan     Continue current plan          Relevant Medications    tiZANidine (ZANAFLEX) 4 MG tablet    naratriptan (AMERGE) 2.5 MG tablet    butalbital-acetaminophen-caffeine -40 mg (FIORICET, ESGIC) -40 mg per tablet       ENT    Bruxism    Overview     Recommended tizanidine         Relevant Medications    tiZANidine (ZANAFLEX) 4 MG tablet    TMJ capsulitis    Overview     Right, after dental procedure  Injected with lidocaine/steroid 11/21/19  Improving with jaw PT at start PT         Current Assessment & Plan     She is planning to establish with a TMJ dental specialist within the next year. She feels this is the source of her migraines and pain issues.           Other Visit Diagnoses       Cervical myofascial pain syndrome        Relevant Medications    tiZANidine (ZANAFLEX) 4 MG tablet                      Please call our clinic at [phone] or send a message on the [employer] portal if there are any changes to the plan described below, for example,if you are not contacted for the requested tests, referral(s) within one week, if you are unable to receive the medications prescribed, or if you feel you need to change the treatment course for any reason.     TESTING:  -- none    REFERRALS:  -- none     PREVENTION (use daily regardless of headache):  -- continue  magnesium in ONE of the following preparations -               1. Magnesium oxide 800mg daily (the most common over the  counter kind, may causes loose stools)              2. Magnesium citrate 400-500mg daily (harder to find, but more neutral on the bowels)              3. Magnesium glycinate 400mg daily (hardest to find, look online, but most bowel-neutral, best absorbed)     AS-NEEDED TREATMENT (use total no more than 10 days per month unless otherwise stated):  -- continue naratriptan with next migraine. You can repeat two hours later if needed. Next step would be Nurtec   -- continue Nurtec every other day  -- START tizanidine at night. This is a muscle relaxer and it will also make you potentially sleepy. Start with half a tablet to see how you respond but can take up to a whole tablet if needed    Follow up in about 1 year (around 6/27/2025).    Yolanda Keller NP

## 2024-06-27 NOTE — PATIENT INSTRUCTIONS
Please call our clinic at [phone] or send a message on the [employer] portal if there are any changes to the plan described below, for example,if you are not contacted for the requested tests, referral(s) within one week, if you are unable to receive the medications prescribed, or if you feel you need to change the treatment course for any reason.     TESTING:  -- none    REFERRALS:  -- none     PREVENTION (use daily regardless of headache):  -- continue  magnesium in ONE of the following preparations -               1. Magnesium oxide 800mg daily (the most common over the counter kind, may causes loose stools)              2. Magnesium citrate 400-500mg daily (harder to find, but more neutral on the bowels)              3. Magnesium glycinate 400mg daily (hardest to find, look online, but most bowel-neutral, best absorbed)     AS-NEEDED TREATMENT (use total no more than 10 days per month unless otherwise stated):  -- continue naratriptan with next migraine. You can repeat two hours later if needed. Next step would be Nurtec   -- continue Nurtec every other day  -- START tizanidine at night. This is a muscle relaxer and it will also make you potentially sleepy. Start with half a tablet to see how you respond but can take up to a whole tablet if needed

## 2024-06-27 NOTE — ASSESSMENT & PLAN NOTE
She is planning to establish with a TMJ dental specialist within the next year. She feels this is the source of her migraines and pain issues.

## 2024-06-30 DIAGNOSIS — G43.719 INTRACTABLE CHRONIC MIGRAINE WITHOUT AURA AND WITHOUT STATUS MIGRAINOSUS: ICD-10-CM

## 2024-07-02 RX ORDER — NARATRIPTAN 2.5 MG/1
TABLET ORAL
Qty: 10 TABLET | Refills: 5 | Status: SHIPPED | OUTPATIENT
Start: 2024-07-02

## 2024-07-29 DIAGNOSIS — G43.719 INTRACTABLE CHRONIC MIGRAINE WITHOUT AURA AND WITHOUT STATUS MIGRAINOSUS: ICD-10-CM

## 2024-07-29 RX ORDER — BUTALBITAL, ACETAMINOPHEN AND CAFFEINE 50; 325; 40 MG/1; MG/1; MG/1
1 TABLET ORAL EVERY 6 HOURS PRN
Qty: 10 TABLET | Refills: 0 | Status: SHIPPED | OUTPATIENT
Start: 2024-07-29

## 2024-08-28 ENCOUNTER — PATIENT MESSAGE (OUTPATIENT)
Dept: NEUROLOGY | Facility: CLINIC | Age: 34
End: 2024-08-28
Payer: COMMERCIAL

## 2024-08-29 ENCOUNTER — OFFICE VISIT (OUTPATIENT)
Dept: NEUROLOGY | Facility: CLINIC | Age: 34
End: 2024-08-29
Payer: COMMERCIAL

## 2024-08-29 DIAGNOSIS — G43.719 INTRACTABLE CHRONIC MIGRAINE WITHOUT AURA AND WITHOUT STATUS MIGRAINOSUS: Primary | ICD-10-CM

## 2024-08-29 PROCEDURE — 1160F RVW MEDS BY RX/DR IN RCRD: CPT | Mod: CPTII,95,, | Performed by: NURSE PRACTITIONER

## 2024-08-29 PROCEDURE — 1159F MED LIST DOCD IN RCRD: CPT | Mod: CPTII,95,, | Performed by: NURSE PRACTITIONER

## 2024-08-29 PROCEDURE — 99213 OFFICE O/P EST LOW 20 MIN: CPT | Mod: 95,,, | Performed by: NURSE PRACTITIONER

## 2024-08-29 NOTE — PROGRESS NOTES
Date of service: 8/29/2024  Referring provider: No ref. provider found    Subjective:      Chief complaint: Headache       Patient ID: Priscila Vazquez is a 34 y.o. female with anxiety, bruxism, chronic fatigue, arthralgias, hypothyroidism, mitral valve prolapse, migraine who presents for follow up of headache       History of Present Illness    INTERVAL HISTORY 8/29/24  The patient location is: home  The chief complaint leading to consultation is: follow up  Visit type: audiovisual  Face to Face time with patient: 10  20 minutes of total time spent on the encounter, which includes face to face time and non-face to face time preparing to see the patient (eg, review of tests), Obtaining and/or reviewing separately obtained history, Documenting clinical information in the electronic or other health record, Independently interpreting results (not separately reported) and communicating results to the patient/family/caregiver, or Care coordination (not separately reported).   Each patient to whom he or she provides medical services by telemedicine is:  (1) informed of the relationship between the physician and patient and the respective role of any other health care provider with respect to management of the patient; and (2) notified that he or she may decline to receive medical services by telemedicine and may withdraw from such care at any time.    Notes:     Last visit was three months ago and at that time she was doing well.    Today she reports she is about the same. She has periods of more headaches. She has 3-4 headache days per week. She is ready to start Botox. Current pain 0 wtihr mitra 0-8. She takes Fioricet.  Otherwise information below is reviewed and verified with no changes made     INTERVAL HISTORY 6/27/24    Last visit was eight months ago and at that time she was doing better.    Today she reports she is doing well. Current pain 0 with range 0-7. She has 3-4 headache days per week. She takes naratriptan,  "Advil, Fioricet 3-4 days per week. Otherwise information below is reviewed and verified with no changes made     INTERVAL HISTORY 10/12/23    Last visit was three months ago and at that time we did a nerve block.    Today she reports she is better. Current pain 0 with range 0-7. She has 3-5 headache days per week. She takes amerge and Fioricet 3-4 days per week.  Otherwise information below is reviewed and verified with no changes made     INTERVAL HISTORY 3/17/23    Last visit was a little less than two months ago and at that time plan was to start naratriptan and continue PT.    Today she reports she is the same. Headaches worsen after strenuous activity. Headaches are right sided. Current pain 4 with range 1-8. She has 5-6 headache days per week. She takes Nurtec, naratriptan, Fioricet as needed. She will be starting PT in three days. She believes her headaches are coming from her shoulder, neck and jaw pain. Otherwise information below is reviewed and verified with no changes made     ORIGINAL HEADACHE HISTORY - 1/25/23  Age at onset and course over time: since age 14  Migraines began to worsen after a jaw surgery. Every time she does PT, she has significant improvement while doing PT.    Botox was previously recommended but she reports she "keeps chickening out". She is unsure if she wants to start Botox.     Aura - bright flashing lights, lasts 15 minutes, always followed by migraine     Family history of migraines - mom, maternal aunt  Location: right side  Quality:  [] pressure [] tight [x] throbbing [] sharp [] stabbing   Severity: current 1 with range 0-7  Duration: hours   Frequency: 4-5 days per week  Headaches awaken at night?:    Worst time of day: varies   Associated with: [x] photophobia [x]  phonophobia [] osmophobia [] blurred vision  [] double vision [] loss of appetite [x] nausea [x] vomiting [] dizziness [] vertigo  [] tinnitus [] irritability [] sinus pressure [] problems with concentration "   [x] neck tightness   Alleviated by:  [x] sleep [x] darkness [x] massage [x] heat [] ice [x] medication  Exacerbated by:  [x] fatigue [x] light [x] noise [x] smells [x] coughing [] sneezing  [] bending over [] ovulation [] menses [] alcohol [] change in weather []  stress  Ipsilateral autonomic: [] nasal congestion [] lacrimation [] ptosis [] injection [] edema [] foreign body sensation [] ear fullness   ICP:  [] transient visual obscurations  [] tinnitus   [] positional headache  [x] non-positional   Sleep habits:  Caffeine intake:  Gyn status (if female): not actively trying to conceive     Current acute treatment:  Zofran  Naratriptan   Naproxen  Toradol - for severe migraines, once per month  Celebrex  Fioricet - once per month or less    Current prevention:  Zoloft - weaning off     Previously tried/failed acute treatment:  Tylenol  Rizatriptan - 4-5 times per week  Ativan  Nurtec - QOD - stopped menstrual cycle ?  Baclofen  Flexeril  Tizanidine    Previously tried/failed preventative treatment:  topiramate - caused eye pain, blurred vision   Duloxetine - tried 10 days, blurred vision but did help headaches   Desvenlafaxine 50mg     Review of patient's allergies indicates:  No Known Allergies  Current Outpatient Medications   Medication Sig Dispense Refill    ARMOUR THYROID 60 mg Tab Take 1 tablet (60 mg total) by mouth once daily. 30 tablet 11    butalbital-acetaminophen-caffeine -40 mg (FIORICET, ESGIC) -40 mg per tablet TAKE 1 TABLET BY MOUTH EVERY 6 HOURS AS NEEDED FOR HEADACHES 10 tablet 0    clindamycin-benzoyl peroxide (BENZACLIN) gel SMARTSIG:Sparingly Topical Every Morning      clobetasol 0.05% (TEMOVATE) 0.05 % Oint SMARTSIG:Sparingly Topical Twice Daily      LORazepam (ATIVAN) 0.5 MG tablet TAKE 1-2 TABLETS BY MOUTH DAILY AS NEEDED      naratriptan (AMERGE) 2.5 MG tablet take 1 tablet by mouth at onset of headache, may repeat in 4 hours if needed. no more than 10 days per month. 10  tablet 5    ondansetron (ZOFRAN-ODT) 8 MG TbDL DISSOLVE 1 TABLET IN MOUTH EVERY 6 HOURS AS NEEDED 25 tablet 5    tiZANidine (ZANAFLEX) 4 MG tablet Half or full tablet by mouth at night as needed for muscle spasm 30 tablet 11    WINLEVI 1 % Crea APPLY A SMALL AMOUNT TO AFFECTED AREA TWICE A DAY CAN SPOT TREAT       No current facility-administered medications for this visit.       Past Medical History  Past Medical History:   Diagnosis Date    Atypical Chest Pain 04/2016     Dr. Jeffy Cartagena Ruled Out GB DX With 2016 U/S    Chronic Fatigue     4/22/16 B12 = Normal; 4/19/16 Extensive Labs Reviewed    Chronic Mild Shortness Of Breath     4/19/16 Ordered Echocardiogram    Family H/O Gabino's Disease     Her Mother Has This; 04/2016 Random Cortisol And Cortrosyn Stimulation Test Were Normal    Generalized Arthralgias X 2 Weeks 4/19/16 FBM?    4/22/16 RF, MICHAEL, CPK, ESR = Normal     Generalized Paresthesias     Dr. Guille Cabrera Ruled MS Out 03/2016    Hypothyroidism     On [place] Thyroid; Her Mother Has Hypothyroidism Too    Migraines     Dr. Guille Cabrera; Her Mother Also Has Migraines    Mitral Valve Prolapse Ruled Out     4/22/16 Echocardiogram = Normal But With Thickened MV With Trivial MR    MTHFR Single Mutation     Her Natriopath Tested This 3/7/16    Polycystic Ovarian Syndrome     Dr. Renea Medrano    Situational Anxiety        Past Surgical History  Past Surgical History:   Procedure Laterality Date    MITRAL VALVE SURGERY      patient denies surgery on mitral valve    None         Family History  Family History   Problem Relation Name Age of Onset    Migraines Mother      Thyroid disease Mother      Breast cancer Maternal Aunt  60       Social History  Social History     Socioeconomic History    Marital status: Single   Tobacco Use    Smoking status: Never    Smokeless tobacco: Never   Substance and Sexual Activity    Alcohol use: Yes     Comment: social     Drug use: No    Sexual activity: Never      Social Determinants of Health     Financial Resource Strain: Low Risk  (8/29/2024)    Overall Financial Resource Strain (CARDIA)     Difficulty of Paying Living Expenses: Not very hard   Food Insecurity: No Food Insecurity (8/29/2024)    Hunger Vital Sign     Worried About Running Out of Food in the Last Year: Never true     Ran Out of Food in the Last Year: Never true   Physical Activity: Insufficiently Active (8/29/2024)    Exercise Vital Sign     Days of Exercise per Week: 2 days     Minutes of Exercise per Session: 30 min   Stress: Stress Concern Present (8/29/2024)    [employer] [place] [employer] - Occupational Stress Questionnaire     Feeling of Stress : To some extent   Housing Stability: Unknown (8/29/2024)    Housing Stability Vital Sign     Unable to Pay for Housing in the Last Year: No        Objective:        There were no vitals filed for this visit.          There is no height or weight on file to calculate BMI.    8/29/24  Constitutional:   She appears well-developed and well-nourished. She is well groomed     Neurological Exam:  General: well-developed, well-nourished, intermittently tearful  Mental status: Awake and alert  Speech language: No dysarthria or aphasia on conversation  Cranial nerves: Face symmetric    Data Review:     I have personally reviewed the referring provider's notes, labs, & imaging made available to me today.      RADIOLOGY STUDIES:  I have personally reviewed the pertinent images performed.       Results for orders placed or performed during the hospital encounter of 10/21/19   CT Head Without Contrast    Narrative    EXAMINATION:  CT HEAD WITHOUT CONTRAST    CLINICAL HISTORY:  Headache, acute, norm neuro exam;    TECHNIQUE:  Axial images of the head were obtained without IV contrast administration.  Coronal and sagittal reconstructions were provided.  Three dimensional and MIP images were obtained and evaluated.  Total DLP was 820.64 mGy-cm. Dose lowering  technique and automated exposure control were utilized for this exam.    COMPARISON:  CT of the head 01/02/2013.    FINDINGS:  There is normal brain formation.  There is normal gray-white matter differentiation.  There is no hemorrhage, hydrocephalus, or midline shift.  There is no cytotoxic or vasogenic edema.  There is no intra or extra-axial fluid collection.  There is no herniation.    The calvarium is intact.  There is no fracture.  The bilateral orbits are normal.  The paranasal sinuses and mastoid air cells are normally developed and well aerated.      Impression    No acute intracranial abnormality.      Electronically signed by: Albino Win MD  Date:    10/21/2019  Time:    14:41       Lab Results   Component Value Date     04/13/2023    K 4.3 04/13/2023     04/13/2023    CO2 27 04/13/2023    BUN 9 04/13/2023    CREATININE 0.68 04/13/2023     04/13/2023    AST 23 04/13/2023    AST 15 04/12/2016    ALT 19 04/13/2023    ALBUMIN 4.9 04/13/2023    PROT 8.1 04/13/2023    BILITOT 0.6 04/13/2023    CHOL 204 (H) 04/13/2023    HDL 48 04/13/2023    LDLCALC 136.4 04/13/2023    TRIG 98 04/13/2023       Lab Results   Component Value Date    WBC 6.59 04/13/2023    HGB 14.3 04/13/2023    HCT 41.5 04/13/2023    MCV 87 04/13/2023     04/13/2023       Lab Results   Component Value Date    TSH 1.350 04/13/2023           Assessment & Plan:       Problem List Items Addressed This Visit          Neuro    Intractable chronic migraine without aura and without status migrainosus - Primary    Overview     Migraine headaches since age 14 with strong family history. Headaches are typically unilateral, moderate to severe in intensity, worsen with activity, pounding in quality and associated with sensitivity to light and sound.   Gradual progression pattern, lack of red flag features on history, and normal neurological exam are reassuring for primary as opposed to secondary etiology of headaches thus imaging  will not be pursued for this history and this exam at this time.    She has about 16 migraine days per month. The patient has chronic migraines ( G43.719) and suffers from headaches more than 3 months, more than 15 days of headache days per month lasting more than 4 hours with at least 8 attacks that meet criteria for migraine. She has tried multiple medications including but not limited to sertraline, Topamax, Cymbalta, desvenlafaxine  The patient has been unresponsive and refractory.The patient meets criteria for chronic headaches according to the ICHD-II, the patient has more than 15 headaches a month which last for more than 4 hours a day. The patient is an ideal candidate for Botox. After treatment, I expect 50%  improvement in the patient's symptoms. A reduction of at least 7 days per month and the number of cumulative hours suffering with headaches as well as at least 100 total hours affected with migraine per month.  DESCRIPTION OF PROCEDURE: After obtaining informed consent and under aseptic technique, a total of 155 units of botulinum toxin type A to be injected in the following muscles:      -- Procerus 5 units  --  5 units bilaterally  -- Frontalis 20 units  -- Temporalis 20 units bilaterally  -- Occipitalis 15 units bilaterally  -- Upper cervical paraspinals 10 units bilaterally  -- Trapezius 15 units bilaterally.       Unavoidable waste 45 units      We discussed need to limit any triptan to 10 days per month or less. Naratriptan has been effective. Nurtec caused side effects.         Current Assessment & Plan     Start Botox.         Relevant Orders    Prior authorization Order                   Please call our clinic at [phone] or send a message on the [facility] portal if there are any changes to the plan described below, for example,if you are not contacted for the requested tests, referral(s) within one week, if you are unable to receive the medications prescribed, or if you feel you  need to change the treatment course for any reason.     TESTING:  -- none    REFERRALS:  -- none     PREVENTION (use daily regardless of headache):  -- continue  magnesium in ONE of the following preparations -               1. Magnesium oxide 800mg daily (the most common over the counter kind, may causes loose stools)              2. Magnesium citrate 400-500mg daily (harder to find, but more neutral on the bowels)              3. Magnesium glycinate 400mg daily (hardest to find, look online, but most bowel-neutral, best absorbed)   -- SEEK authorization for Botox    AS-NEEDED TREATMENT (use total no more than 10 days per month unless otherwise stated):  -- continue naratriptan with next migraine. You can repeat two hours later if needed. Next step would be Nurtec   -- continue Nurtec every other day  -- continue tizanidine at night. This is a muscle relaxer and it will also make you potentially sleepy. Start with half a tablet to see how you respond but can take up to a whole tablet if needed    Follow up in about 3 weeks (around 9/19/2024) for first botox.    Yolanda Keller NP

## 2024-08-29 NOTE — PATIENT INSTRUCTIONS
Please call our clinic at [phone] or send a message on the [facility] portal if there are any changes to the plan described below, for example,if you are not contacted for the requested tests, referral(s) within one week, if you are unable to receive the medications prescribed, or if you feel you need to change the treatment course for any reason.     TESTING:  -- none    REFERRALS:  -- none     PREVENTION (use daily regardless of headache):  -- continue  magnesium in ONE of the following preparations -               1. Magnesium oxide 800mg daily (the most common over the counter kind, may causes loose stools)              2. Magnesium citrate 400-500mg daily (harder to find, but more neutral on the bowels)              3. Magnesium glycinate 400mg daily (hardest to find, look online, but most bowel-neutral, best absorbed)   -- SEEK authorization for Botox    AS-NEEDED TREATMENT (use total no more than 10 days per month unless otherwise stated):  -- continue naratriptan with next migraine. You can repeat two hours later if needed. Next step would be Nurtec   -- continue Nurtec every other day  -- continue tizanidine at night. This is a muscle relaxer and it will also make you potentially sleepy. Start with half a tablet to see how you respond but can take up to a whole tablet if needed

## 2024-09-23 ENCOUNTER — PROCEDURE VISIT (OUTPATIENT)
Dept: NEUROLOGY | Facility: CLINIC | Age: 34
End: 2024-09-23
Payer: COMMERCIAL

## 2024-09-23 VITALS
SYSTOLIC BLOOD PRESSURE: 118 MMHG | HEART RATE: 85 BPM | WEIGHT: 160.5 LBS | RESPIRATION RATE: 17 BRPM | HEIGHT: 65 IN | BODY MASS INDEX: 26.74 KG/M2 | DIASTOLIC BLOOD PRESSURE: 80 MMHG | TEMPERATURE: 97 F

## 2024-09-23 DIAGNOSIS — G43.719 INTRACTABLE CHRONIC MIGRAINE WITHOUT AURA AND WITHOUT STATUS MIGRAINOSUS: Primary | ICD-10-CM

## 2024-09-23 PROCEDURE — 64615 CHEMODENERV MUSC MIGRAINE: CPT | Mod: S$GLB,,, | Performed by: NURSE PRACTITIONER

## 2024-09-23 NOTE — PROCEDURES
Procedures  A time out was conducted just before the start of the procedure to verify the correct patient and procedure, procedure location, and all relevant critical information.      Conventional methods of treatment such as multiple medications, both on and   off label have been tried including: sertraline, Topamax, Cymbalta, desvenlafaxine      The patient has been unresponsive and refractory.The patient meets criteria for chronic headaches according to the ICHD-II, the patient has more than 15 headaches a month which last for more than 4 hours a day.     Botox session number: 1  Last session was 12 weeks ago and resulted in improvement of: n/a     I am aiming for at least 50%  improvement in the patient's symptoms. Frequency of treatment is every 3 months unless no response to the treatments, at which time we will discontinue the injections.      DESCRIPTION OF PROCEDURE: After obtaining informed consent and under   aseptic technique, a total of 145 units of botulinum toxin type A were   injected in the following muscles:      -- Procerus 5 units  --  5 units bilaterally -- SPARED  -- Frontalis 20 units  -- Temporalis 20 units bilaterally  -- Occipitalis 15 units bilaterally  -- Upper cervical paraspinals 10 units bilaterally  -- Trapezius 15 units bilaterally.      The patient tolerated the procedure well. There were no complications. The patient was given a prescription for repeat treatment in 12 weeks      Unavoidable waste 55 units    CYNDY Espinoza

## 2024-09-25 ENCOUNTER — PATIENT MESSAGE (OUTPATIENT)
Dept: NEUROLOGY | Facility: CLINIC | Age: 34
End: 2024-09-25
Payer: COMMERCIAL

## 2024-09-25 DIAGNOSIS — G43.719 INTRACTABLE CHRONIC MIGRAINE WITHOUT AURA AND WITHOUT STATUS MIGRAINOSUS: ICD-10-CM

## 2024-09-25 RX ORDER — BUTALBITAL, ACETAMINOPHEN AND CAFFEINE 50; 325; 40 MG/1; MG/1; MG/1
1 TABLET ORAL EVERY 6 HOURS PRN
Qty: 10 TABLET | Refills: 0 | Status: SHIPPED | OUTPATIENT
Start: 2024-09-25

## 2024-10-24 DIAGNOSIS — G43.719 INTRACTABLE CHRONIC MIGRAINE WITHOUT AURA AND WITHOUT STATUS MIGRAINOSUS: ICD-10-CM

## 2024-10-25 RX ORDER — BUTALBITAL, ACETAMINOPHEN AND CAFFEINE 50; 325; 40 MG/1; MG/1; MG/1
1 TABLET ORAL EVERY 6 HOURS PRN
Qty: 10 TABLET | Refills: 0 | Status: SHIPPED | OUTPATIENT
Start: 2024-10-25

## 2024-11-04 ENCOUNTER — OFFICE VISIT (OUTPATIENT)
Dept: NEUROLOGY | Facility: CLINIC | Age: 34
End: 2024-11-04
Payer: COMMERCIAL

## 2024-11-04 DIAGNOSIS — G43.719 INTRACTABLE CHRONIC MIGRAINE WITHOUT AURA AND WITHOUT STATUS MIGRAINOSUS: Primary | ICD-10-CM

## 2024-11-04 PROCEDURE — 1160F RVW MEDS BY RX/DR IN RCRD: CPT | Mod: CPTII,95,, | Performed by: NURSE PRACTITIONER

## 2024-11-04 PROCEDURE — 1159F MED LIST DOCD IN RCRD: CPT | Mod: CPTII,95,, | Performed by: NURSE PRACTITIONER

## 2024-11-04 PROCEDURE — 99213 OFFICE O/P EST LOW 20 MIN: CPT | Mod: 95,,, | Performed by: NURSE PRACTITIONER

## 2024-11-04 NOTE — PROGRESS NOTES
Date of service: 11/4/2024  Referring provider: No ref. provider found    Subjective:      Chief complaint: Headache       Patient ID: Priscila Vazquez is a 34 y.o. female with anxiety, bruxism, chronic fatigue, arthralgias, hypothyroidism, mitral valve prolapse, migraine who presents for follow up of headache     History of Present Illness    INTERVAL HISTORY 11/4/24  The patient location is: home  The chief complaint leading to consultation is: follow up  Visit type: audiovisual  20 minutes of total time spent on the encounter, which includes face to face time and non-face to face time preparing to see the patient (eg, review of tests), Obtaining and/or reviewing separately obtained history, Documenting clinical information in the electronic or other health record, Independently interpreting results (not separately reported) and communicating results to the patient/family/caregiver, or Care coordination (not separately reported).   Each patient to whom he or she provides medical services by telemedicine is:  (1) informed of the relationship between the physician and patient and the respective role of any other health care provider with respect to management of the patient; and (2) notified that he or she may decline to receive medical services by telemedicine and may withdraw from such care at any time.    Notes:     Last office visit was about two months ago and first Botox was about six weeks ago.    Today she reports she is about the same. Almost immediately after the injections, she felt good for a few weeks. She has started PT again. She reports 2-3 headache days per week. Current pain 3 with range 0-7. She takes naratriptan and Fioricet as needed. No side effects from Botox. Otherwise information below is reviewed and verified with no changes made    INTERVAL HISTORY 8/29/24  The patient location is: home  The chief complaint leading to consultation is: follow up  Visit type: audiovisual  Face to Face time with  Laceration Repair patient: 10  20 minutes of total time spent on the encounter, which includes face to face time and non-face to face time preparing to see the patient (eg, review of tests), Obtaining and/or reviewing separately obtained history, Documenting clinical information in the electronic or other health record, Independently interpreting results (not separately reported) and communicating results to the patient/family/caregiver, or Care coordination (not separately reported).   Each patient to whom he or she provides medical services by telemedicine is:  (1) informed of the relationship between the physician and patient and the respective role of any other health care provider with respect to management of the patient; and (2) notified that he or she may decline to receive medical services by telemedicine and may withdraw from such care at any time.    Notes:     Last visit was three months ago and at that time she was doing well.    Today she reports she is about the same. She has periods of more headaches. She has 3-4 headache days per week. She is ready to start Botox. Current pain 0 wtihr mitra 0-8. She takes Fioricet.  Otherwise information below is reviewed and verified with no changes made     INTERVAL HISTORY 6/27/24    Last visit was eight months ago and at that time she was doing better.    Today she reports she is doing well. Current pain 0 with range 0-7. She has 3-4 headache days per week. She takes naratriptan, Advil, Fioricet 3-4 days per week. Otherwise information below is reviewed and verified with no changes made     INTERVAL HISTORY 10/12/23    Last visit was three months ago and at that time we did a nerve block.    Today she reports she is better. Current pain 0 with range 0-7. She has 3-5 headache days per week. She takes amerge and Fioricet 3-4 days per week.  Otherwise information below is reviewed and verified with no changes made     INTERVAL HISTORY 3/17/23    Last visit was a little less than two  "months ago and at that time plan was to start naratriptan and continue PT.    Today she reports she is the same. Headaches worsen after strenuous activity. Headaches are right sided. Current pain 4 with range 1-8. She has 5-6 headache days per week. She takes Nurtec, naratriptan, Fioricet as needed. She will be starting PT in three days. She believes her headaches are coming from her shoulder, neck and jaw pain. Otherwise information below is reviewed and verified with no changes made     ORIGINAL HEADACHE HISTORY - 1/25/23  Age at onset and course over time: since age 14  Migraines began to worsen after a jaw surgery. Every time she does PT, she has significant improvement while doing PT.    Botox was previously recommended but she reports she "keeps chickening out". She is unsure if she wants to start Botox.     Aura - bright flashing lights, lasts 15 minutes, always followed by migraine     Family history of migraines - mom, maternal aunt  Location: right side  Quality:  [] pressure [] tight [x] throbbing [] sharp [] stabbing   Severity: current 1 with range 0-7  Duration: hours   Frequency: 4-5 days per week  Headaches awaken at night?:    Worst time of day: varies   Associated with: [x] photophobia [x]  phonophobia [] osmophobia [] blurred vision  [] double vision [] loss of appetite [x] nausea [x] vomiting [] dizziness [] vertigo  [] tinnitus [] irritability [] sinus pressure [] problems with concentration   [x] neck tightness   Alleviated by:  [x] sleep [x] darkness [x] massage [x] heat [] ice [x] medication  Exacerbated by:  [x] fatigue [x] light [x] noise [x] smells [x] coughing [] sneezing  [] bending over [] ovulation [] menses [] alcohol [] change in weather []  stress  Ipsilateral autonomic: [] nasal congestion [] lacrimation [] ptosis [] injection [] edema [] foreign body sensation [] ear fullness   ICP:  [] transient visual obscurations  [] tinnitus   [] positional headache  [x] non-positional   Sleep " habits:  Caffeine intake:  Gyn status (if female): not actively trying to conceive     Current acute treatment:  Zofran  Naratriptan   Naproxen  Toradol - for severe migraines, once per month  Celebrex  Fioricet - once per month or less  Tizanidine    Current prevention:  Zoloft - weaning off   Botox - first 9/23/24    Previously tried/failed acute treatment:  Tylenol  Rizatriptan - 4-5 times per week  Ativan  Nurtec - QOD - stopped menstrual cycle ?  Baclofen  Flexeril  Tizanidine    Previously tried/failed preventative treatment:  topiramate - caused eye pain, blurred vision   Duloxetine - tried 10 days, blurred vision but did help headaches   Desvenlafaxine 50mg     Review of patient's allergies indicates:  No Known Allergies  Current Outpatient Medications   Medication Sig Dispense Refill    ARMOUR THYROID 60 mg Tab Take 1 tablet (60 mg total) by mouth once daily. 30 tablet 11    butalbital-acetaminophen-caffeine -40 mg (FIORICET, ESGIC) -40 mg per tablet TAKE 1 TABLET BY MOUTH EVERY 6 HOURS AS NEEDED FOR HEADACHES 10 tablet 0    clindamycin-benzoyl peroxide (BENZACLIN) gel SMARTSIG:Sparingly Topical Every Morning (Patient not taking: Reported on 10/1/2024)      clobetasol 0.05% (TEMOVATE) 0.05 % Oint SMARTSIG:Sparingly Topical Twice Daily (Patient not taking: Reported on 10/1/2024)      LORazepam (ATIVAN) 0.5 MG tablet TAKE 1-2 TABLETS BY MOUTH DAILY AS NEEDED      naratriptan (AMERGE) 2.5 MG tablet take 1 tablet by mouth at onset of headache, may repeat in 4 hours if needed. no more than 10 days per month. 10 tablet 5    ondansetron (ZOFRAN-ODT) 8 MG TbDL DISSOLVE 1 TABLET IN MOUTH EVERY 6 HOURS AS NEEDED 25 tablet 5    tiZANidine (ZANAFLEX) 4 MG tablet Half or full tablet by mouth at night as needed for muscle spasm 30 tablet 11    WINLEVI 1 % Crea APPLY A SMALL AMOUNT TO AFFECTED AREA TWICE A DAY CAN SPOT TREAT       Current Facility-Administered Medications   Medication Dose Route Frequency  Provider Last Rate Last Admin    onabotulinumtoxina injection 200 Units  200 Units Intramuscular q12 weeks    200 Units at 09/23/24 1535       Past Medical History  Past Medical History:   Diagnosis Date    Atypical Chest Pain 04/2016     Dr. Jeffy Cartagena Ruled Out GB DX With 2016 U/S    Chronic Fatigue     4/22/16 B12 = Normal; 4/19/16 Extensive Labs Reviewed    Chronic Mild Shortness Of Breath     4/19/16 Ordered Echocardiogram    Family H/O [place]'s Disease     Her Mother Has This; 04/2016 Random Cortisol And Cortrosyn Stimulation Test Were Normal    Generalized Arthralgias X 2 Weeks 4/19/16 FBM?    4/22/16 RF, MICHAEL, CPK, ESR = Normal     Generalized Paresthesias     Dr. Guille Cabrera Ruled MS Out 03/2016    Hypothyroidism     On [place] Thyroid; Her Mother Has Hypothyroidism Too    Migraines     Dr. Guille Cabrera; Her Mother Also Has Migraines    Mitral Valve Prolapse Ruled Out     4/22/16 Echocardiogram = Normal But With Thickened MV With Trivial MR    MTHFR Single Mutation     Her Natriopath Tested This 3/7/16    Polycystic Ovarian Syndrome     Dr. Renea Medrano    Situational Anxiety        Past Surgical History  Past Surgical History:   Procedure Laterality Date    MITRAL VALVE SURGERY      patient denies surgery on mitral valve    None         Family History  Family History   Problem Relation Name Age of Onset    Migraines Mother      Thyroid disease Mother      Breast cancer Maternal Aunt  60       Social History  Social History     Socioeconomic History    Marital status: Single   Tobacco Use    Smoking status: Never    Smokeless tobacco: Never   Substance and Sexual Activity    Alcohol use: Yes     Comment: social     Drug use: No    Sexual activity: Never     Social Drivers of Health     Financial Resource Strain: Low Risk  (8/29/2024)    Overall Financial Resource Strain (CARDIA)     Difficulty of Paying Living Expenses: Not very hard   Food Insecurity: No Food Insecurity (8/29/2024)    Hunger Vital  Sign     Worried About Running Out of Food in the Last Year: Never true     Ran Out of Food in the Last Year: Never true   Physical Activity: Insufficiently Active (8/29/2024)    Exercise Vital Sign     Days of Exercise per Week: 2 days     Minutes of Exercise per Session: 30 min   Stress: Stress Concern Present (8/29/2024)    [employer] [place] [employer] - Occupational Stress Questionnaire     Feeling of Stress : To some extent   Housing Stability: Unknown (8/29/2024)    Housing Stability Vital Sign     Unable to Pay for Housing in the Last Year: No        Objective:        There were no vitals filed for this visit.          There is no height or weight on file to calculate BMI.    11/4/24  Constitutional:   She appears well-developed and well-nourished. She is well groomed     Neurological Exam:  General: well-developed, well-nourished, intermittently tearful  Mental status: Awake and alert  Speech language: No dysarthria or aphasia on conversation  Cranial nerves: Face symmetric    Data Review:     I have personally reviewed the referring provider's notes, labs, & imaging made available to me today.      RADIOLOGY STUDIES:  I have personally reviewed the pertinent images performed.       Results for orders placed or performed during the hospital encounter of 10/21/19   CT Head Without Contrast    Narrative    EXAMINATION:  CT HEAD WITHOUT CONTRAST    CLINICAL HISTORY:  Headache, acute, norm neuro exam;    TECHNIQUE:  Axial images of the head were obtained without IV contrast administration.  Coronal and sagittal reconstructions were provided.  Three dimensional and MIP images were obtained and evaluated.  Total DLP was 820.64 mGy-cm. Dose lowering technique and automated exposure control were utilized for this exam.    COMPARISON:  CT of the head 01/02/2013.    FINDINGS:  There is normal brain formation.  There is normal gray-white matter differentiation.  There is no hemorrhage, hydrocephalus, or  midline shift.  There is no cytotoxic or vasogenic edema.  There is no intra or extra-axial fluid collection.  There is no herniation.    The calvarium is intact.  There is no fracture.  The bilateral orbits are normal.  The paranasal sinuses and mastoid air cells are normally developed and well aerated.      Impression    No acute intracranial abnormality.      Electronically signed by: Ablino Win MD  Date:    10/21/2019  Time:    14:41       Lab Results   Component Value Date     04/13/2023    K 4.3 04/13/2023     04/13/2023    CO2 27 04/13/2023    BUN 9 04/13/2023    CREATININE 0.68 04/13/2023     04/13/2023    AST 23 04/13/2023    AST 15 04/12/2016    ALT 19 04/13/2023    ALBUMIN 4.9 04/13/2023    PROT 8.1 04/13/2023    BILITOT 0.6 04/13/2023    CHOL 204 (H) 04/13/2023    HDL 48 04/13/2023    LDLCALC 136.4 04/13/2023    TRIG 98 04/13/2023       Lab Results   Component Value Date    WBC 6.59 04/13/2023    HGB 14.3 04/13/2023    HCT 41.5 04/13/2023    MCV 87 04/13/2023     04/13/2023       Lab Results   Component Value Date    TSH 1.350 04/13/2023           Assessment & Plan:       Problem List Items Addressed This Visit          Neuro    Intractable chronic migraine without aura and without status migrainosus - Primary    Overview     Migraine headaches since age 14 with strong family history. Headaches are typically unilateral, moderate to severe in intensity, worsen with activity, pounding in quality and associated with sensitivity to light and sound.   Gradual progression pattern, lack of red flag features on history, and normal neurological exam are reassuring for primary as opposed to secondary etiology of headaches thus imaging will not be pursued for this history and this exam at this time.    She has about 16 migraine days per month. The patient has chronic migraines ( G43.719) and suffers from headaches more than 3 months, more than 15 days of headache days per month lasting  more than 4 hours with at least 8 attacks that meet criteria for migraine. She has tried multiple medications including but not limited to sertraline, Topamax, Cymbalta, desvenlafaxine  The patient has been unresponsive and refractory.The patient meets criteria for chronic headaches according to the ICHD-II, the patient has more than 15 headaches a month which last for more than 4 hours a day. The patient is an ideal candidate for Botox. After treatment, I expect 50%  improvement in the patient's symptoms. A reduction of at least 7 days per month and the number of cumulative hours suffering with headaches as well as at least 100 total hours affected with migraine per month.  DESCRIPTION OF PROCEDURE: After obtaining informed consent and under aseptic technique, a total of 155 units of botulinum toxin type A to be injected in the following muscles:      -- Procerus 5 units  --  5 units bilaterally  -- Frontalis 20 units  -- Temporalis 20 units bilaterally  -- Occipitalis 15 units bilaterally  -- Upper cervical paraspinals 10 units bilaterally  -- Trapezius 15 units bilaterally.       Unavoidable waste 45 units      We discussed need to limit any triptan to 10 days per month or less. Naratriptan has been effective. Nurtec caused side effects.         Current Assessment & Plan     She is six weeks s/p first Botox. Had some improvement, no side effects. Continue current plan.                          Please call our clinic at [phone] or send a message on the [facility] portal if there are any changes to the plan described below, for example,if you are not contacted for the requested tests, referral(s) within one week, if you are unable to receive the medications prescribed, or if you feel you need to change the treatment course for any reason.     TESTING:  -- none    REFERRALS:  -- none     PREVENTION (use daily regardless of headache):  -- continue  magnesium in ONE of the following preparations                1. Magnesium oxide 800mg daily (the most common over the counter kind, may causes loose stools)              2. Magnesium citrate 400-500mg daily (harder to find, but more neutral on the bowels)              3. Magnesium glycinate 400mg daily (hardest to find, look online, but most bowel-neutral, best absorbed)   -- continue Botox    AS-NEEDED TREATMENT (use total no more than 10 days per month unless otherwise stated):  -- continue naratriptan with next migraine. You can repeat two hours later if needed. Next step would be Nurtec   -- continue Nurtec every other day  -- continue tizanidine at night. This is a muscle relaxer and it will also make you potentially sleepy. Start with half a tablet to see how you respond but can take up to a whole tablet if needed    Follow up in 5 weeks (on 12/9/2024) for botox.    Yolanda Keller, NP

## 2024-11-04 NOTE — PATIENT INSTRUCTIONS
Please call our clinic at [phone] or send a message on the [facility] portal if there are any changes to the plan described below, for example,if you are not contacted for the requested tests, referral(s) within one week, if you are unable to receive the medications prescribed, or if you feel you need to change the treatment course for any reason.     TESTING:  -- none    REFERRALS:  -- none     PREVENTION (use daily regardless of headache):  -- continue  magnesium in ONE of the following preparations               1. Magnesium oxide 800mg daily (the most common over the counter kind, may causes loose stools)              2. Magnesium citrate 400-500mg daily (harder to find, but more neutral on the bowels)              3. Magnesium glycinate 400mg daily (hardest to find, look online, but most bowel-neutral, best absorbed)   -- continue Botox    AS-NEEDED TREATMENT (use total no more than 10 days per month unless otherwise stated):  -- continue naratriptan with next migraine. You can repeat two hours later if needed. Next step would be Nurtec   -- continue Nurtec every other day  -- continue tizanidine at night. This is a muscle relaxer and it will also make you potentially sleepy. Start with half a tablet to see how you respond but can take up to a whole tablet if needed

## 2024-11-20 DIAGNOSIS — G43.719 INTRACTABLE CHRONIC MIGRAINE WITHOUT AURA AND WITHOUT STATUS MIGRAINOSUS: ICD-10-CM

## 2024-11-20 RX ORDER — BUTALBITAL, ACETAMINOPHEN AND CAFFEINE 50; 325; 40 MG/1; MG/1; MG/1
1 TABLET ORAL EVERY 6 HOURS PRN
Qty: 10 TABLET | Refills: 3 | Status: SHIPPED | OUTPATIENT
Start: 2024-11-20

## 2024-11-22 DIAGNOSIS — G43.719 INTRACTABLE CHRONIC MIGRAINE WITHOUT AURA AND WITHOUT STATUS MIGRAINOSUS: ICD-10-CM

## 2024-11-22 RX ORDER — NARATRIPTAN 2.5 MG/1
TABLET ORAL
Qty: 10 TABLET | Refills: 5 | Status: SHIPPED | OUTPATIENT
Start: 2024-11-22

## 2024-12-05 ENCOUNTER — TELEPHONE (OUTPATIENT)
Dept: NEUROLOGY | Facility: CLINIC | Age: 34
End: 2024-12-05
Payer: COMMERCIAL

## 2024-12-05 DIAGNOSIS — G43.719 INTRACTABLE CHRONIC MIGRAINE WITHOUT AURA AND WITHOUT STATUS MIGRAINOSUS: Primary | ICD-10-CM

## 2025-01-06 ENCOUNTER — TELEPHONE (OUTPATIENT)
Dept: NEUROLOGY | Facility: CLINIC | Age: 35
End: 2025-01-06
Payer: COMMERCIAL

## 2025-01-06 NOTE — TELEPHONE ENCOUNTER
----- Message from Adithya sent at 1/6/2025  3:29 PM CST -----  Type: Needs Medical Advice    Who Called:  Pt    Best Call Back Number: [phone]    Additional Information: Pt calling back to schedule botox em, if possible sometime this week. Pt is going out of town next week. She's been in pain recently. Please call back to advise, Thanks!

## 2025-01-23 ENCOUNTER — PROCEDURE VISIT (OUTPATIENT)
Dept: NEUROLOGY | Facility: CLINIC | Age: 35
End: 2025-01-23
Payer: COMMERCIAL

## 2025-01-23 VITALS
BODY MASS INDEX: 25.83 KG/M2 | WEIGHT: 155 LBS | HEIGHT: 65 IN | DIASTOLIC BLOOD PRESSURE: 83 MMHG | RESPIRATION RATE: 17 BRPM | SYSTOLIC BLOOD PRESSURE: 120 MMHG | HEART RATE: 74 BPM | TEMPERATURE: 97 F

## 2025-01-23 DIAGNOSIS — G43.719 INTRACTABLE CHRONIC MIGRAINE WITHOUT AURA AND WITHOUT STATUS MIGRAINOSUS: Primary | ICD-10-CM

## 2025-01-23 PROCEDURE — 64615 CHEMODENERV MUSC MIGRAINE: CPT | Mod: S$GLB,,, | Performed by: NURSE PRACTITIONER

## 2025-01-23 NOTE — PROCEDURES
Procedures  A time out was conducted just before the start of the procedure to verify the correct patient and procedure, procedure location, and all relevant critical information.      Conventional methods of treatment such as multiple medications, both on and   off label have been tried including: sertraline, Topamax, Cymbalta, desvenlafaxine      The patient has been unresponsive and refractory.The patient meets criteria for chronic headaches according to the ICHD-II, the patient has more than 15 headaches a month which last for more than 4 hours a day.     Botox session number: 2  Last session was 12 weeks ago and resulted in improvement of: n/a     I am aiming for at least 50%  improvement in the patient's symptoms. Frequency of treatment is every 3 months unless no response to the treatments, at which time we will discontinue the injections.      DESCRIPTION OF PROCEDURE: After obtaining informed consent and under   aseptic technique, a total of 145 units of botulinum toxin type A were   injected in the following muscles:      -- Procerus 5 units  --  5 units bilaterally -- SPARED  -- Frontalis 20 units  -- Temporalis 20 units bilaterally  -- Occipitalis 15 units bilaterally  -- Upper cervical paraspinals 10 units bilaterally  -- Trapezius 15 units bilaterally.      The patient tolerated the procedure well. There were no complications. The patient was given a prescription for repeat treatment in 12 weeks      Unavoidable waste 55 units    CYNDY Espinoza

## 2025-02-10 DIAGNOSIS — G43.719 INTRACTABLE CHRONIC MIGRAINE WITHOUT AURA AND WITHOUT STATUS MIGRAINOSUS: ICD-10-CM

## 2025-02-10 RX ORDER — BUTALBITAL, ACETAMINOPHEN AND CAFFEINE 50; 325; 40 MG/1; MG/1; MG/1
1 TABLET ORAL EVERY 6 HOURS PRN
Qty: 10 TABLET | Refills: 0 | Status: SHIPPED | OUTPATIENT
Start: 2025-02-10

## 2025-04-10 ENCOUNTER — PATIENT MESSAGE (OUTPATIENT)
Dept: NEUROLOGY | Facility: CLINIC | Age: 35
End: 2025-04-10
Payer: COMMERCIAL

## 2025-04-17 ENCOUNTER — PROCEDURE VISIT (OUTPATIENT)
Dept: NEUROLOGY | Facility: CLINIC | Age: 35
End: 2025-04-17
Payer: COMMERCIAL

## 2025-04-17 VITALS
HEART RATE: 91 BPM | RESPIRATION RATE: 18 BRPM | BODY MASS INDEX: 25.83 KG/M2 | DIASTOLIC BLOOD PRESSURE: 75 MMHG | HEIGHT: 65 IN | SYSTOLIC BLOOD PRESSURE: 110 MMHG | WEIGHT: 155 LBS

## 2025-04-17 DIAGNOSIS — G43.719 INTRACTABLE CHRONIC MIGRAINE WITHOUT AURA AND WITHOUT STATUS MIGRAINOSUS: Primary | ICD-10-CM

## 2025-04-17 NOTE — PROCEDURES
Procedures  A time out was conducted just before the start of the procedure to verify the correct patient and procedure, procedure location, and all relevant critical information.      Conventional methods of treatment such as multiple medications, both on and   off label have been tried including: sertraline, Topamax, Cymbalta, desvenlafaxine      The patient has been unresponsive and refractory.The patient meets criteria for chronic headaches according to the ICHD-II, the patient has more than 15 headaches a month which last for more than 4 hours a day.     Botox session number: 3  Last session was 12 weeks ago and resulted in improvement of: n/a     I am aiming for at least 50%  improvement in the patient's symptoms. Frequency of treatment is every 3 months unless no response to the treatments, at which time we will discontinue the injections.      DESCRIPTION OF PROCEDURE: After obtaining informed consent and under   aseptic technique, a total of 145 units of botulinum toxin type A were   injected in the following muscles:      -- Procerus 5 units  --  5 units bilaterally -- SPARED  -- Frontalis 20 units  -- Temporalis 20 units bilaterally  -- Occipitalis 15 units bilaterally  -- Upper cervical paraspinals 10 units bilaterally  -- Trapezius 15 units bilaterally.      The patient tolerated the procedure well. There were no complications. The patient was given a prescription for repeat treatment in 12 weeks      Unavoidable waste 55 units    CYNDY Espinoza

## 2025-04-22 DIAGNOSIS — G43.719 INTRACTABLE CHRONIC MIGRAINE WITHOUT AURA AND WITHOUT STATUS MIGRAINOSUS: ICD-10-CM

## 2025-04-22 RX ORDER — NARATRIPTAN 2.5 MG/1
TABLET ORAL
Qty: 10 TABLET | Refills: 5 | Status: SHIPPED | OUTPATIENT
Start: 2025-04-22

## 2025-04-24 ENCOUNTER — TELEPHONE (OUTPATIENT)
Dept: NEUROLOGY | Facility: CLINIC | Age: 35
End: 2025-04-24
Payer: COMMERCIAL

## 2025-04-24 DIAGNOSIS — G43.719 INTRACTABLE CHRONIC MIGRAINE WITHOUT AURA AND WITHOUT STATUS MIGRAINOSUS: ICD-10-CM

## 2025-04-24 RX ORDER — BUTALBITAL, ACETAMINOPHEN AND CAFFEINE 50; 325; 40 MG/1; MG/1; MG/1
1 TABLET ORAL EVERY 6 HOURS PRN
Qty: 10 TABLET | Refills: 2 | Status: SHIPPED | OUTPATIENT
Start: 2025-04-24

## 2025-06-23 ENCOUNTER — PATIENT MESSAGE (OUTPATIENT)
Dept: NEUROLOGY | Facility: CLINIC | Age: 35
End: 2025-06-23
Payer: COMMERCIAL

## 2025-06-26 ENCOUNTER — PROCEDURE VISIT (OUTPATIENT)
Dept: NEUROLOGY | Facility: CLINIC | Age: 35
End: 2025-06-26
Payer: COMMERCIAL

## 2025-06-26 VITALS
TEMPERATURE: 99 F | SYSTOLIC BLOOD PRESSURE: 110 MMHG | RESPIRATION RATE: 16 BRPM | WEIGHT: 165.38 LBS | HEIGHT: 65 IN | BODY MASS INDEX: 27.56 KG/M2 | HEART RATE: 74 BPM | DIASTOLIC BLOOD PRESSURE: 72 MMHG

## 2025-06-26 DIAGNOSIS — G43.719 INTRACTABLE CHRONIC MIGRAINE WITHOUT AURA AND WITHOUT STATUS MIGRAINOSUS: Primary | ICD-10-CM

## 2025-06-26 NOTE — PROCEDURES
Procedures  A time out was conducted just before the start of the procedure to verify the correct patient and procedure, procedure location, and all relevant critical information.      Conventional methods of treatment such as multiple medications, both on and   off label have been tried including: sertraline, Topamax, Cymbalta, desvenlafaxine      The patient has been unresponsive and refractory.The patient meets criteria for chronic headaches according to the ICHD-II, the patient has more than 15 headaches a month which last for more than 4 hours a day.     Botox session number: 4  Last session was 12 weeks ago and resulted in improvement of: 50%     I am aiming for at least 50%  improvement in the patient's symptoms. Frequency of treatment is every 3 months unless no response to the treatments, at which time we will discontinue the injections.      DESCRIPTION OF PROCEDURE: After obtaining informed consent and under   aseptic technique, a total of 165 units of botulinum toxin type A were   injected in the following muscles:      -- Procerus 5 units  --  5 units bilaterally   -- Frontalis 20 units  -- Temporalis 20 units bilaterally  -- Occipitalis 15 units bilaterally  -- Upper cervical paraspinals 10 units bilaterally  -- Trapezius 15 units bilaterally.   -- MASSETERS 5 units bilaterally     The patient tolerated the procedure well. There were no complications. The patient was given a prescription for repeat treatment in 12 weeks      Unavoidable waste 35 units    CYNDY Espinoza

## 2025-07-15 DIAGNOSIS — M79.18 CERVICAL MYOFASCIAL PAIN SYNDROME: ICD-10-CM

## 2025-07-15 DIAGNOSIS — G43.719 INTRACTABLE CHRONIC MIGRAINE WITHOUT AURA AND WITHOUT STATUS MIGRAINOSUS: ICD-10-CM

## 2025-07-15 DIAGNOSIS — F45.8 BRUXISM: ICD-10-CM

## 2025-07-15 RX ORDER — TIZANIDINE 4 MG/1
TABLET ORAL
Qty: 30 TABLET | Refills: 11 | Status: SHIPPED | OUTPATIENT
Start: 2025-07-15

## 2025-08-08 ENCOUNTER — OFFICE VISIT (OUTPATIENT)
Dept: NEUROLOGY | Facility: CLINIC | Age: 35
End: 2025-08-08
Payer: COMMERCIAL

## 2025-08-08 DIAGNOSIS — G43.719 INTRACTABLE CHRONIC MIGRAINE WITHOUT AURA AND WITHOUT STATUS MIGRAINOSUS: Primary | ICD-10-CM

## 2025-08-08 DIAGNOSIS — F45.8 BRUXISM: ICD-10-CM

## 2025-08-08 NOTE — PATIENT INSTRUCTIONS
Please call our clinic at [phone] or send a message on the [facility] portal if there are any changes to the plan described below, for example,if you are not contacted for the requested tests, referral(s) within one week, if you are unable to receive the medications prescribed, or if you feel you need to change the treatment course for any reason.     TESTING:  -- none    REFERRALS:  -- none     PREVENTION (use daily regardless of headache):  -- continue  magnesium in ONE of the following preparations               1. Magnesium oxide 800mg daily (the most common over the counter kind, may causes loose stools)              2. Magnesium citrate 400-500mg daily (harder to find, but more neutral on the bowels)              3. Magnesium glycinate 400mg daily (hardest to find, look online, but most bowel-neutral, best absorbed)   -- continue Botox    AS-NEEDED TREATMENT (use total no more than 10 days per month unless otherwise stated):  -- continue naratriptan with next migraine. You can repeat two hours later if needed. Next step would be Nurtec   -- continue Nurtec every other day  -- continue tizanidine at night. This is a muscle relaxer and it will also make you potentially sleepy. Start with half a tablet to see how you respond but can take up to a whole tablet if needed

## 2025-08-08 NOTE — ASSESSMENT & PLAN NOTE
She is s/p fourth Botox and has had about 50% improvement in migraine frequency. She is happy with current plan.

## 2025-08-08 NOTE — PROGRESS NOTES
Date of service: 8/8/2025  Referring provider: No ref. provider found    Subjective:      Chief complaint: Headache       Patient ID: Priscila Vazquez is a 35 y.o. female with anxiety, bruxism, chronic fatigue, arthralgias, hypothyroidism, mitral valve prolapse, migraine who presents for follow up of headache     History of Present Illness    INTERVAL HISTORY 8/8/25  The patient location is: home  The chief complaint leading to consultation is: follow up  Visit type: audiovisual  20 minutes of total time spent on the encounter, which includes face to face time and non-face to face time preparing to see the patient (eg, review of tests), Obtaining and/or reviewing separately obtained history, Documenting clinical information in the electronic or other health record, Independently interpreting results (not separately reported) and communicating results to the patient/family/caregiver, or Care coordination (not separately reported).   Each patient to whom he or she provides medical services by telemedicine is:  (1) informed of the relationship between the physician and patient and the respective role of any other health care provider with respect to management of the patient; and (2) notified that he or she may decline to receive medical services by telemedicine and may withdraw from such care at any time.    Notes:     Last office visit was almost one year ago and most recent Botox was about six weeks ago.    Today she reports she is a little better. She does have more shoulder/trap pain the two weeks after Botox. She has fewer migraines. She reports mild daily headache associated with bruxism with escalation to migraine 2 days per week. Current pain 0 with range 0-7. She takes naratriptan or Fioricet as needed. Otherwise information below is reviewed and verified with no changes made    INTERVAL HISTORY 11/4/24  The patient location is: home  The chief complaint leading to consultation is: follow up  Visit type:  audiovisual  20 minutes of total time spent on the encounter, which includes face to face time and non-face to face time preparing to see the patient (eg, review of tests), Obtaining and/or reviewing separately obtained history, Documenting clinical information in the electronic or other health record, Independently interpreting results (not separately reported) and communicating results to the patient/family/caregiver, or Care coordination (not separately reported).   Each patient to whom he or she provides medical services by telemedicine is:  (1) informed of the relationship between the physician and patient and the respective role of any other health care provider with respect to management of the patient; and (2) notified that he or she may decline to receive medical services by telemedicine and may withdraw from such care at any time.    Notes:     Last office visit was about two months ago and first Botox was about six weeks ago.    Today she reports she is about the same. Almost immediately after the injections, she felt good for a few weeks. She has started PT again. She reports 2-3 headache days per week. Current pain 3 with range 0-7. She takes naratriptan and Fioricet as needed. No side effects from Botox. Otherwise information below is reviewed and verified with no changes made    INTERVAL HISTORY 8/29/24  The patient location is: home  The chief complaint leading to consultation is: follow up  Visit type: audiovisual  Face to Face time with patient: 10  20 minutes of total time spent on the encounter, which includes face to face time and non-face to face time preparing to see the patient (eg, review of tests), Obtaining and/or reviewing separately obtained history, Documenting clinical information in the electronic or other health record, Independently interpreting results (not separately reported) and communicating results to the patient/family/caregiver, or Care coordination (not separately  reported).   Each patient to whom he or she provides medical services by telemedicine is:  (1) informed of the relationship between the physician and patient and the respective role of any other health care provider with respect to management of the patient; and (2) notified that he or she may decline to receive medical services by telemedicine and may withdraw from such care at any time.    Notes:     Last visit was three months ago and at that time she was doing well.    Today she reports she is about the same. She has periods of more headaches. She has 3-4 headache days per week. She is ready to start Botox. Current pain 0 wtihr mitra 0-8. She takes Fioricet.  Otherwise information below is reviewed and verified with no changes made     INTERVAL HISTORY 6/27/24    Last visit was eight months ago and at that time she was doing better.    Today she reports she is doing well. Current pain 0 with range 0-7. She has 3-4 headache days per week. She takes naratriptan, Advil, Fioricet 3-4 days per week. Otherwise information below is reviewed and verified with no changes made     INTERVAL HISTORY 10/12/23    Last visit was three months ago and at that time we did a nerve block.    Today she reports she is better. Current pain 0 with range 0-7. She has 3-5 headache days per week. She takes amerge and Fioricet 3-4 days per week.  Otherwise information below is reviewed and verified with no changes made     INTERVAL HISTORY 3/17/23    Last visit was a little less than two months ago and at that time plan was to start naratriptan and continue PT.    Today she reports she is the same. Headaches worsen after strenuous activity. Headaches are right sided. Current pain 4 with range 1-8. She has 5-6 headache days per week. She takes Nurtec, naratriptan, Fioricet as needed. She will be starting PT in three days. She believes her headaches are coming from her shoulder, neck and jaw pain. Otherwise information below is reviewed and  "verified with no changes made     ORIGINAL HEADACHE HISTORY - 1/25/23  Age at onset and course over time: since age 14  Migraines began to worsen after a jaw surgery. Every time she does PT, she has significant improvement while doing PT.    Botox was previously recommended but she reports she "keeps chickening out". She is unsure if she wants to start Botox.     Aura - bright flashing lights, lasts 15 minutes, always followed by migraine     Family history of migraines - mom, maternal aunt  Location: right side  Quality:  [] pressure [] tight [x] throbbing [] sharp [] stabbing   Severity: current 1 with range 0-7  Duration: hours   Frequency: 4-5 days per week  Headaches awaken at night?:    Worst time of day: varies   Associated with: [x] photophobia [x]  phonophobia [] osmophobia [] blurred vision  [] double vision [] loss of appetite [x] nausea [x] vomiting [] dizziness [] vertigo  [] tinnitus [] irritability [] sinus pressure [] problems with concentration   [x] neck tightness   Alleviated by:  [x] sleep [x] darkness [x] massage [x] heat [] ice [x] medication  Exacerbated by:  [x] fatigue [x] light [x] noise [x] smells [x] coughing [] sneezing  [] bending over [] ovulation [] menses [] alcohol [] change in weather []  stress  Ipsilateral autonomic: [] nasal congestion [] lacrimation [] ptosis [] injection [] edema [] foreign body sensation [] ear fullness   ICP:  [] transient visual obscurations  [] tinnitus   [] positional headache  [x] non-positional   Sleep habits:  Caffeine intake:  Gyn status (if female): not actively trying to conceive     Current acute treatment:  Zofran  Naratriptan   Naproxen  Toradol - for severe migraines, once per month  Celebrex  Fioricet - once per month or less  Tizanidine    Current prevention:  Zoloft   Botox - first 9/23/24    Previously tried/failed acute treatment:  Tylenol  Rizatriptan - 4-5 times per week  Ativan  Nurtec - QOD - stopped menstrual cycle " ?  Baclofen  Flexeril  Tizanidine    Previously tried/failed preventative treatment:  topiramate - caused eye pain, blurred vision   Duloxetine - tried 10 days, blurred vision but did help headaches   Desvenlafaxine 50mg     Review of patient's allergies indicates:  No Known Allergies  Current Outpatient Medications   Medication Sig Dispense Refill    ARMOUR THYROID 60 mg Tab Take 1 tablet (60 mg total) by mouth once daily. 30 tablet 11    butalbital-acetaminophen-caffeine -40 mg (FIORICET, ESGIC) -40 mg per tablet TAKE 1 TABLET BY MOUTH EVERY 6 HOURS AS NEEDED FOR HEADACHES 10 tablet 2    clindamycin-benzoyl peroxide (BENZACLIN) gel  (Patient not taking: Reported on 6/26/2025)      clobetasol 0.05% (TEMOVATE) 0.05 % Oint  (Patient not taking: Reported on 6/26/2025)      LORazepam (ATIVAN) 0.5 MG tablet TAKE 1-2 TABLETS BY MOUTH DAILY AS NEEDED      naratriptan (AMERGE) 2.5 MG tablet take 1 tablet by mouth at onset of headache, may repeat in 4 hours if needed. no more than 10 days per month. 10 tablet 5    ondansetron (ZOFRAN-ODT) 8 MG TbDL DISSOLVE 1 TABLET IN MOUTH EVERY 6 HOURS AS NEEDED 25 tablet 5    tiZANidine (ZANAFLEX) 4 MG tablet take 1/2 TO 1 TABLET BY MOUTH AT NIGHT AS NEEDED FOR MUSCLE SPASMS 30 tablet 11    WINLEVI 1 % Crea APPLY A SMALL AMOUNT TO AFFECTED AREA TWICE A DAY CAN SPOT TREAT (Patient not taking: Reported on 6/26/2025)       Current Facility-Administered Medications   Medication Dose Route Frequency Provider Last Rate Last Admin    onabotulinumtoxina injection 200 Units  200 Units Intramuscular q12 weeks    200 Units at 06/26/25 1551       Past Medical History  Past Medical History:   Diagnosis Date    Atypical Chest Pain 04/2016     Dr. Jeffy Cartagena Ruled Out GB DX With 2016 U/S    Chronic Fatigue     4/22/16 B12 = Normal; 4/19/16 Extensive Labs Reviewed    Chronic Mild Shortness Of Breath     4/19/16 Ordered Echocardiogram    Family H/O Gabino's Disease     Her Mother Has  This; 04/2016 Random Cortisol And Cortrosyn Stimulation Test Were Normal    Generalized Arthralgias X 2 Weeks 4/19/16 FBM?    4/22/16 RF, MICHAEL, CPK, ESR = Normal     Generalized Paresthesias     Dr. Guille Cabrera Ruled MS Out 03/2016    Hypothyroidism     On [place] Thyroid; Her Mother Has Hypothyroidism Too    Migraines     Dr. Guille Cabrera; Her Mother Also Has Migraines    Mitral Valve Prolapse Ruled Out     4/22/16 Echocardiogram = Normal But With Thickened MV With Trivial MR    MTHFR Single Mutation     Her Natriopath Tested This 3/7/16    Polycystic Ovarian Syndrome     Dr. Renea Medrano    Situational Anxiety        Past Surgical History  Past Surgical History:   Procedure Laterality Date    MITRAL VALVE SURGERY      patient denies surgery on mitral valve    None         Family History  Family History   Problem Relation Name Age of Onset    Migraines Mother      Thyroid disease Mother      Breast cancer Maternal Aunt  60       Social History  Social History     Socioeconomic History    Marital status: Single   Tobacco Use    Smoking status: Never    Smokeless tobacco: Never   Substance and Sexual Activity    Alcohol use: Yes     Comment: social     Drug use: No    Sexual activity: Never     Social Drivers of Health     Financial Resource Strain: Low Risk  (8/8/2025)    Overall Financial Resource Strain (CARDIA)     Difficulty of Paying Living Expenses: Not very hard   Food Insecurity: No Food Insecurity (8/8/2025)    Hunger Vital Sign     Worried About Running Out of Food in the Last Year: Never true     Ran Out of Food in the Last Year: Never true   Transportation Needs: No Transportation Needs (8/8/2025)    PRAPARE - Transportation     Lack of Transportation (Medical): No     Lack of Transportation (Non-Medical): No   Physical Activity: Inactive (8/8/2025)    Exercise Vital Sign     Days of Exercise per Week: 0 days     Minutes of Exercise per Session: 30 min   Stress: No Stress Concern Present (8/8/2025)     [facility] - Occupational Stress Questionnaire     Feeling of Stress : Only a little   Housing Stability: Low Risk  (8/8/2025)    Housing Stability Vital Sign     Unable to Pay for Housing in the Last Year: No     Number of Times Moved in the Last Year: 0     Homeless in the Last Year: No        Objective:        There were no vitals filed for this visit.          There is no height or weight on file to calculate BMI.    8/8/25  Constitutional:   She appears well-developed and well-nourished. She is well groomed     Neurological Exam:  General: well-developed, well-nourished, intermittently tearful  Mental status: Awake and alert  Speech language: No dysarthria or aphasia on conversation  Cranial nerves: Face symmetric    Data Review:     I have personally reviewed the referring provider's notes, labs, & imaging made available to me today.      RADIOLOGY STUDIES:  I have personally reviewed the pertinent images performed.       Results for orders placed or performed during the hospital encounter of 10/21/19   CT Head Without Contrast    Narrative    EXAMINATION:  CT HEAD WITHOUT CONTRAST    CLINICAL HISTORY:  Headache, acute, norm neuro exam;    TECHNIQUE:  Axial images of the head were obtained without IV contrast administration.  Coronal and sagittal reconstructions were provided.  Three dimensional and MIP images were obtained and evaluated.  Total DLP was 820.64 mGy-cm. Dose lowering technique and automated exposure control were utilized for this exam.    COMPARISON:  CT of the head 01/02/2013.    FINDINGS:  There is normal brain formation.  There is normal gray-white matter differentiation.  There is no hemorrhage, hydrocephalus, or midline shift.  There is no cytotoxic or vasogenic edema.  There is no intra or extra-axial fluid collection.  There is no herniation.    The calvarium is intact.  There is no fracture.  The bilateral orbits are normal.  The paranasal sinuses and  mastoid air cells are normally developed and well aerated.      Impression    No acute intracranial abnormality.      Electronically signed by: Albino Win MD  Date:    10/21/2019  Time:    14:41       Lab Results   Component Value Date     03/28/2025    K 4.0 03/28/2025     03/28/2025    CO2 27 03/28/2025    BUN 7 03/28/2025    CREATININE 0.56 03/28/2025    GLU 78 03/28/2025    AST 14 03/28/2025    AST 15 04/12/2016    ALT 12 03/28/2025    ALBUMIN 4.5 03/28/2025    PROT 7.3 03/28/2025    BILITOT 0.6 03/28/2025    CHOL 183 03/28/2025    HDL 51 03/28/2025    LDLCALC 106.0 03/28/2025    TRIG 130 03/28/2025       Lab Results   Component Value Date    WBC 5.33 03/28/2025    HGB 13.1 03/28/2025    HCT 38.5 03/28/2025    MCV 89 03/28/2025     03/28/2025       Lab Results   Component Value Date    TSH 0.678 03/28/2025           Assessment & Plan:       Problem List Items Addressed This Visit          Neuro    Intractable chronic migraine without aura and without status migrainosus - Primary    Overview   Migraine headaches since age 14 with strong family history. Headaches are typically unilateral, moderate to severe in intensity, worsen with activity, pounding in quality and associated with sensitivity to light and sound.   Gradual progression pattern, lack of red flag features on history, and normal neurological exam are reassuring for primary as opposed to secondary etiology of headaches thus imaging will not be pursued for this history and this exam at this time.    She has about 16 migraine days per month. The patient has chronic migraines ( G43.719) and suffers from headaches more than 3 months, more than 15 days of headache days per month lasting more than 4 hours with at least 8 attacks that meet criteria for migraine. She has tried multiple medications including but not limited to sertraline, Topamax, Cymbalta, desvenlafaxine  The patient has been unresponsive and refractory.The patient meets  criteria for chronic headaches according to the ICHD-II, the patient has more than 15 headaches a month which last for more than 4 hours a day. The patient is an ideal candidate for Botox. After treatment, I expect 50%  improvement in the patient's symptoms. A reduction of at least 7 days per month and the number of cumulative hours suffering with headaches as well as at least 100 total hours affected with migraine per month.  DESCRIPTION OF PROCEDURE: After obtaining informed consent and under aseptic technique, a total of 155 units of botulinum toxin type A to be injected in the following muscles:      -- Procerus 5 units  --  5 units bilaterally  -- Frontalis 20 units  -- Temporalis 20 units bilaterally  -- Occipitalis 15 units bilaterally  -- Upper cervical paraspinals 10 units bilaterally  -- Trapezius 15 units bilaterally.       Unavoidable waste 45 units      We discussed need to limit any triptan to 10 days per month or less. Naratriptan has been effective. Nurtec caused side effects.         Current Assessment & Plan   She is s/p fourth Botox and has had about 50% improvement in migraine frequency. She is happy with current plan.             ENT    Bruxism    Overview   Recommended tizanidine         Current Assessment & Plan   Increase Botox in masseters to 10 units bilaterally                             Please call our clinic at [phone] or send a message on the [facility] portal if there are any changes to the plan described below, for example,if you are not contacted for the requested tests, referral(s) within one week, if you are unable to receive the medications prescribed, or if you feel you need to change the treatment course for any reason.     TESTING:  -- none    REFERRALS:  -- none     PREVENTION (use daily regardless of headache):  -- continue  magnesium in ONE of the following preparations               1. Magnesium oxide 800mg daily (the most common over the counter kind, may  causes loose stools)              2. Magnesium citrate 400-500mg daily (harder to find, but more neutral on the bowels)              3. Magnesium glycinate 400mg daily (hardest to find, look online, but most bowel-neutral, best absorbed)   -- continue Botox    AS-NEEDED TREATMENT (use total no more than 10 days per month unless otherwise stated):  -- continue naratriptan with next migraine. You can repeat two hours later if needed. Next step would be Nurtec   -- continue Nurtec every other day  -- continue tizanidine at night. This is a muscle relaxer and it will also make you potentially sleepy. Start with half a tablet to see how you respond but can take up to a whole tablet if needed    Follow up in 1 month (on 9/8/2025) for botox.    Yolanda Keller NP